# Patient Record
Sex: FEMALE | Race: WHITE | NOT HISPANIC OR LATINO | Employment: UNEMPLOYED | URBAN - METROPOLITAN AREA
[De-identification: names, ages, dates, MRNs, and addresses within clinical notes are randomized per-mention and may not be internally consistent; named-entity substitution may affect disease eponyms.]

---
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
## 2022-01-05 DIAGNOSIS — Z11.59 SCREENING FOR VIRAL DISEASE: Primary | ICD-10-CM

## 2022-01-05 PROCEDURE — U0003 INFECTIOUS AGENT DETECTION BY NUCLEIC ACID (DNA OR RNA); SEVERE ACUTE RESPIRATORY SYNDROME CORONAVIRUS 2 (SARS-COV-2) (CORONAVIRUS DISEASE [COVID-19]), AMPLIFIED PROBE TECHNIQUE, MAKING USE OF HIGH THROUGHPUT TECHNOLOGIES AS DESCRIBED BY CMS-2020-01-R: HCPCS | Performed by: INTERNAL MEDICINE

## 2022-01-05 PROCEDURE — U0005 INFEC AGEN DETEC AMPLI PROBE: HCPCS | Performed by: INTERNAL MEDICINE

## 2022-06-03 DIAGNOSIS — J40 BRONCHITIS: Primary | ICD-10-CM

## 2022-06-03 RX ORDER — ALBUTEROL SULFATE 90 UG/1
2 AEROSOL, METERED RESPIRATORY (INHALATION) EVERY 6 HOURS PRN
Qty: 18 G | Refills: 5 | Status: SHIPPED | OUTPATIENT
Start: 2022-06-03

## 2022-06-03 RX ORDER — AZITHROMYCIN 250 MG/1
TABLET, FILM COATED ORAL
Qty: 6 TABLET | Refills: 0 | Status: SHIPPED | OUTPATIENT
Start: 2022-06-03 | End: 2022-06-07

## 2022-06-08 DIAGNOSIS — J32.9 SINUSITIS, UNSPECIFIED CHRONICITY, UNSPECIFIED LOCATION: Primary | ICD-10-CM

## 2022-06-08 RX ORDER — LEVOFLOXACIN 500 MG/1
500 TABLET, FILM COATED ORAL EVERY 24 HOURS
Qty: 10 TABLET | Refills: 0 | Status: SHIPPED | OUTPATIENT
Start: 2022-06-08 | End: 2022-06-18

## 2022-07-19 ENCOUNTER — CLINICAL SUPPORT (OUTPATIENT)
Dept: FAMILY MEDICINE CLINIC | Facility: CLINIC | Age: 22
End: 2022-07-19

## 2022-07-19 DIAGNOSIS — F41.9 ANXIETY: Primary | ICD-10-CM

## 2022-07-26 DIAGNOSIS — F32.A DEPRESSION, UNSPECIFIED DEPRESSION TYPE: Primary | ICD-10-CM

## 2022-07-26 RX ORDER — NORTRIPTYLINE HYDROCHLORIDE 25 MG/1
25 CAPSULE ORAL
Qty: 30 CAPSULE | Refills: 3 | Status: SHIPPED | OUTPATIENT
Start: 2022-07-26 | End: 2022-08-17

## 2022-08-16 ENCOUNTER — TELEPHONE (OUTPATIENT)
Dept: OTHER | Facility: OTHER | Age: 22
End: 2022-08-16

## 2022-08-16 ENCOUNTER — OFFICE VISIT (OUTPATIENT)
Dept: FAMILY MEDICINE CLINIC | Facility: CLINIC | Age: 22
End: 2022-08-16
Payer: COMMERCIAL

## 2022-08-16 VITALS — DIASTOLIC BLOOD PRESSURE: 80 MMHG | HEART RATE: 100 BPM | SYSTOLIC BLOOD PRESSURE: 120 MMHG

## 2022-08-16 DIAGNOSIS — L02.31 ABSCESS, GLUTEAL, LEFT: Primary | ICD-10-CM

## 2022-08-16 PROCEDURE — 99214 OFFICE O/P EST MOD 30 MIN: CPT | Performed by: INTERNAL MEDICINE

## 2022-08-16 NOTE — TELEPHONE ENCOUNTER
Patient's father along with patient calling in to make an appt with General Surgery after referral today from PCP office  The closest location to them is the [facility] office but they are willing to go wherever she can get the first available consult  Please follow up to schedule appt

## 2022-08-16 NOTE — ASSESSMENT & PLAN NOTE
Left gluteal abscess noted 3 days ago following a possible insect bite  Manual expression of purulent material done in the office  Concern for possible purulent loculations  She denies fever, nausea vomiting or any signs of systemic toxicity  Will need general surgery evaluation for possible I& D   · Ambulatory referral to General surgery  · Continue as needed Tylenol/Advil  · Hold off on warm water soaks for now    · Call office with signs of worsening or systemic toxicity  · Will hold off on antibiotics for now - advised to call office if worsening  · If worsening, consider a MRSA-covering antimicrobial

## 2022-08-16 NOTE — PROGRESS NOTES
Assessment/Plan:     1  Abscess, gluteal, left  Assessment & Plan:  Left gluteal abscess noted 3 days ago following a possible insect bite  Manual expression of purulent material done in the office  Concern for possible purulent loculations  She denies fever, nausea vomiting or any signs of systemic toxicity  Will need general surgery evaluation for possible I& D   · Ambulatory referral to General surgery  · Continue as needed Tylenol/Advil  · Hold off on warm water soaks for now  · Call office with signs of worsening or systemic toxicity  · Will hold off on antibiotics for now - advised to call office if worsening  · If worsening, consider a MRSA-covering antimicrobial       Orders:  -     Ambulatory Referral to General Surgery; Future; Expected date: 08/17/2022  Diagnoses and all orders for this visit:    Abscess, gluteal, left  -     Ambulatory Referral to General Surgery; Future        Subjective:          Patient ID: Buffy Patterson is a 23 y o  female  Buffy Patterson is a 51-year-old female who is here for evaluation of  possible rash  Patient did state that she was out of [place] where she noticed a bump in the left gluteal area  Initially mild however progressed will bump requiring drainage at home  She applied warm compress, and warm soaks in Epsom salt with minimal relief  Denies any fever, chills, nausea vomiting or any signs of systemic toxicity  Denies any previous occurrence of similar pathology  The following portions of the patient's history were reviewed and updated as appropriate: allergies, current medications, past family history, past medical history, past social history, past surgical history and problem list     Review of Systems   Constitutional: Negative for activity change, appetite change, chills and fever  Respiratory: Negative for cough and chest tightness  Cardiovascular: Negative for chest pain and palpitations     Gastrointestinal: Negative for abdominal distention, diarrhea and vomiting  Genitourinary: Negative for difficulty urinating, dysuria, flank pain and hematuria  Musculoskeletal: Negative for arthralgias, gait problem and myalgias  Skin: Positive for rash and wound  Neurological: Negative for dizziness, seizures and numbness  Objective:      /80   Pulse 100          Physical Exam  Vitals reviewed  Constitutional:       Appearance: Normal appearance  HENT:      Head: Normocephalic and atraumatic  Mouth/Throat:      Mouth: Mucous membranes are moist    Eyes:      Pupils: Pupils are equal, round, and reactive to light  Cardiovascular:      Rate and Rhythm: Normal rate and regular rhythm  Pulses: Normal pulses  Heart sounds: Normal heart sounds  Pulmonary:      Effort: Pulmonary effort is normal  No respiratory distress  Breath sounds: No stridor  Abdominal:      General: Bowel sounds are normal  There is no distension  Palpations: Abdomen is soft  There is no mass  Tenderness: There is no abdominal tenderness  Musculoskeletal:         General: Tenderness present  No swelling  Normal range of motion  Cervical back: Normal range of motion and neck supple  Right lower leg: No edema  Left lower leg: No edema  Legs:    Skin:     General: Skin is warm  Neurological:      Mental Status: She is alert and oriented to person, place, and time  Mental status is at baseline  Psychiatric:         Mood and Affect: Mood normal          Behavior: Behavior normal          Thought Content:  Thought content normal          Judgment: Judgment normal

## 2022-08-17 ENCOUNTER — OFFICE VISIT (OUTPATIENT)
Dept: SURGERY | Facility: CLINIC | Age: 22
End: 2022-08-17
Payer: COMMERCIAL

## 2022-08-17 VITALS — BODY MASS INDEX: 25.84 KG/M2 | HEIGHT: 67 IN

## 2022-08-17 DIAGNOSIS — L02.91 ABSCESS: Primary | ICD-10-CM

## 2022-08-17 DIAGNOSIS — F32.A DEPRESSION, UNSPECIFIED DEPRESSION TYPE: ICD-10-CM

## 2022-08-17 DIAGNOSIS — L02.31 ABSCESS, GLUTEAL, LEFT: Primary | ICD-10-CM

## 2022-08-17 PROCEDURE — 10060 I&D ABSCESS SIMPLE/SINGLE: CPT | Performed by: SURGERY

## 2022-08-17 RX ORDER — CLINDAMYCIN HYDROCHLORIDE 300 MG/1
300 CAPSULE ORAL 3 TIMES DAILY
Qty: 30 CAPSULE | Refills: 0 | Status: SHIPPED | OUTPATIENT
Start: 2022-08-17 | End: 2022-08-27

## 2022-08-17 RX ORDER — NORTRIPTYLINE HYDROCHLORIDE 25 MG/1
CAPSULE ORAL
Qty: 90 CAPSULE | Refills: 2 | Status: SHIPPED | OUTPATIENT
Start: 2022-08-17 | End: 2022-09-29

## 2022-08-17 NOTE — PROGRESS NOTES
Office Visit - General Surgery  Rodrick Iglesias MRN: [id number]  Encounter: [id number]    Assessment and Plan  Problem List Items Addressed This Visit        Other    Abscess, gluteal, left - Primary     There was not a lot of purulence remaining  I did pack the wound  I asked him remove the packing tomorrow and shower daily and keep the wound covered as long as it is draining  I told him a I thought it was reasonable to use antibiotics since there was a fairly large area of erythema  I will see her back here if any changes  Chief Complaint:  Rodrick Iglesias is a 23 y o  female who presents for Follow-up (Bug bite)    Subjective  23year old female who may have gotten bitten on her left buttock last week  She noted it was wound wider and had some drainage over the weekend  She saw her family doctor yesterday was crease a more material out and sent here for further evaluation  Family doc also sent a prescription for antibiotics  She has some pain at the site and notices minimal to moderate drainage  Past Medical History:   Diagnosis Date    Anxiety     Seasonal allergies        History reviewed  No pertinent surgical history      Family History   Problem Relation Age of Onset    Heart attack Mother     Heart disease Mother     Heart disease Father     Diabetes Father     No Known Problems Brother     Breast cancer Maternal Grandmother     Hypertension Maternal Grandmother     Heart disease Maternal Grandmother     Heart disease Maternal Grandfather     Diabetes Maternal Grandfather     Asthma Maternal Grandfather     No Known Problems Paternal Grandmother     Diabetes Paternal Grandfather        Social History     Tobacco Use    Smoking status: Never Smoker    Smokeless tobacco: Never Used   Vaping Use    Vaping Use: Never used   Substance Use Topics    Alcohol use: Not Currently    Drug use: Never        Medications  Current Outpatient Medications on File Prior to Visit Medication Sig Dispense Refill    albuterol (Ventolin HFA) 90 mcg/act inhaler Inhale 2 puffs every 6 (six) hours as needed for wheezing 18 g 5    hydrOXYzine HCL (ATARAX) 25 mg tablet TAKE 1 TABLET (25 MG TOTAL) BY MOUTH 2 TIMES A DAY AS NEEDED FOR ANXIETY 60 tablet 3    nortriptyline (PAMELOR) 25 mg capsule Take 1 capsule (25 mg total) by mouth daily at bedtime 30 capsule 3     No current facility-administered medications on file prior to visit  Allergies  No Known Allergies    Review of Systems    Objective  There were no vitals filed for this visit  Physical Exam  Left buttock almost at the skin crease with her thigh is an open area about 3 x 3 mm with drainage present  There is surrounding erythema for up to 8 cm around this area  No crepitance, no real fluctuance    Procedures  After permission, the areas prepped and draped  Time-out taken  Local anesthesia of 2% lidocaine with epi was infiltrated into the skin and surrounding areas  A scissors was used and cut into the open area and probed  A hemostat was used to de loculated the area  There was not a lot of gross purulence removed  A piece of quarter-inch packing was then placed in dressing applied  Tolerated procedure well

## 2022-08-17 NOTE — ASSESSMENT & PLAN NOTE
There was not a lot of purulence remaining  I did pack the wound  I asked him remove the packing tomorrow and shower daily and keep the wound covered as long as it is draining  I told him a I thought it was reasonable to use antibiotics since there was a fairly large area of erythema  I will see her back here if any changes

## 2022-09-29 DIAGNOSIS — F32.A DEPRESSION, UNSPECIFIED DEPRESSION TYPE: ICD-10-CM

## 2022-09-29 RX ORDER — NORTRIPTYLINE HYDROCHLORIDE 25 MG/1
50 CAPSULE ORAL
Qty: 90 CAPSULE | Refills: 2 | Status: SHIPPED | OUTPATIENT
Start: 2022-09-29 | End: 2022-10-26

## 2022-10-26 DIAGNOSIS — F32.A DEPRESSION, UNSPECIFIED DEPRESSION TYPE: ICD-10-CM

## 2022-10-26 RX ORDER — NORTRIPTYLINE HYDROCHLORIDE 25 MG/1
CAPSULE ORAL
Qty: 180 CAPSULE | Refills: 2 | Status: SHIPPED | OUTPATIENT
Start: 2022-10-26

## 2022-12-04 DIAGNOSIS — F41.9 ANXIETY: ICD-10-CM

## 2022-12-05 RX ORDER — HYDROXYZINE HYDROCHLORIDE 25 MG/1
TABLET, FILM COATED ORAL
Qty: 60 TABLET | Refills: 3 | Status: SHIPPED | OUTPATIENT
Start: 2022-12-05

## 2022-12-31 DIAGNOSIS — F41.9 ANXIETY: ICD-10-CM

## 2023-01-03 RX ORDER — HYDROXYZINE HYDROCHLORIDE 25 MG/1
TABLET, FILM COATED ORAL
Qty: 180 TABLET | Refills: 2 | Status: SHIPPED | OUTPATIENT
Start: 2023-01-03

## 2023-01-12 ENCOUNTER — TELEPHONE (OUTPATIENT)
Dept: PSYCHIATRY | Facility: CLINIC | Age: 23
End: 2023-01-12

## 2023-01-12 ENCOUNTER — OFFICE VISIT (OUTPATIENT)
Dept: OBGYN CLINIC | Facility: MEDICAL CENTER | Age: 23
End: 2023-01-12

## 2023-01-12 VITALS
BODY MASS INDEX: 27.94 KG/M2 | DIASTOLIC BLOOD PRESSURE: 82 MMHG | WEIGHT: 178 LBS | HEIGHT: 67 IN | SYSTOLIC BLOOD PRESSURE: 124 MMHG

## 2023-01-12 DIAGNOSIS — F32.0 CURRENT MILD EPISODE OF MAJOR DEPRESSIVE DISORDER, UNSPECIFIED WHETHER RECURRENT (HCC): ICD-10-CM

## 2023-01-12 DIAGNOSIS — Z11.3 SCREEN FOR STD (SEXUALLY TRANSMITTED DISEASE): ICD-10-CM

## 2023-01-12 DIAGNOSIS — Z01.419 ENCOUNTER FOR ANNUAL ROUTINE GYNECOLOGICAL EXAMINATION: Primary | ICD-10-CM

## 2023-01-12 NOTE — PROGRESS NOTES
Assessment/Plan:    22 yo G0 - annual exam      Problem List Items Addressed This Visit    None  Visit Diagnoses     Encounter for annual routine gynecological examination    -  Primary  First pap today    Current mild episode of major depressive disorder, unspecified whether recurrent [facility])        Relevant Orders    Ambulatory Referral to Behavioral Health Therapists            Subjective:      Patient ID: Suni Krueger is a 23 y o  female  This is a 23 y o   with LMP: 22  Patient is premenopausal     Concerns: none    Contraception: condoms- reports using every time  Does not desire further contraception  Periods: are regular, normal amount and duration  Sexually active: yes,  w/o issue  STD testing: no  No bladder concerns  NO change in bowel movements     Screening:  Gardisil vaccine: UTD    Family history:   Breast cancer: grandmother  Ovarian cancer: none  Colon cancer: none    Body mass index is 28 3 kg/m²  Exercise: 4x/week  Diet:healthy  Smoking: non smoker    Depression/anxiety - meds working well  Myron at [employer] 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  The following portions of the patient's history were reviewed and updated as appropriate: allergies, current medications, past family history, past social history, past surgical history and problem list     Review of Systems   Constitutional: Negative  HENT: Negative  Eyes: Negative  Respiratory: Negative  Cardiovascular: Negative  Gastrointestinal: Negative  Endocrine: Negative  Genitourinary: Negative for dyspareunia, dysuria, frequency, menstrual problem, pelvic pain, vaginal discharge and vaginal pain  Musculoskeletal: Negative  Skin: Negative  Allergic/Immunologic: Negative  Neurological: Negative  Hematological: Negative  Psychiatric/Behavioral: Negative            Objective:      /82 (BP Location: Left arm, Patient Position: Sitting, Cuff Size: Adult)   Ht 5' 6 5" (1 689 m)   Wt 80 7 kg (178 lb)   LMP 12/29/2022 (Exact Date)   BMI 28 30 kg/m²          Physical Exam  Vitals reviewed  Cardiovascular:      Rate and Rhythm: Normal rate  Pulmonary:      Effort: Pulmonary effort is normal    Chest:   Breasts:     Breasts are symmetrical       Right: No mass, nipple discharge, skin change or tenderness  Left: No mass, nipple discharge, skin change or tenderness  Abdominal:      Palpations: Abdomen is soft  Genitourinary:     Labia:         Right: No rash  Left: No rash  Vagina: Normal  No signs of injury  Cervix: No cervical motion tenderness, discharge, friability or lesion  Uterus: Not deviated, not enlarged, not fixed and not tender  Adnexa:         Right: No mass, tenderness or fullness  Left: No mass, tenderness or fullness  Musculoskeletal:      Cervical back: Normal range of motion  Skin:     General: Skin is warm and dry  Neurological:      Mental Status: She is alert and oriented to person, place, and time

## 2023-01-13 LAB
C TRACH DNA SPEC QL NAA+PROBE: NEGATIVE
N GONORRHOEA DNA SPEC QL NAA+PROBE: NEGATIVE

## 2023-01-17 LAB — T VAGINALIS RRNA SPEC QL NAA+PROBE: NEGATIVE

## 2023-01-20 DIAGNOSIS — Z30.011 ORAL CONTRACEPTION INITIAL PRESCRIPTION: Primary | ICD-10-CM

## 2023-01-20 LAB
LAB AP GYN PRIMARY INTERPRETATION: NORMAL
Lab: NORMAL

## 2023-01-20 RX ORDER — DROSPIRENONE AND ETHINYL ESTRADIOL 0.03MG-3MG
1 KIT ORAL DAILY
Qty: 90 TABLET | Refills: 3 | Status: SHIPPED | OUTPATIENT
Start: 2023-01-20

## 2023-03-10 DIAGNOSIS — H92.09 OTALGIA, UNSPECIFIED LATERALITY: Primary | ICD-10-CM

## 2023-03-10 RX ORDER — AMOXICILLIN 500 MG/1
500 CAPSULE ORAL EVERY 12 HOURS SCHEDULED
Qty: 14 CAPSULE | Refills: 0 | Status: SHIPPED | OUTPATIENT
Start: 2023-03-10 | End: 2023-03-17

## 2023-05-15 DIAGNOSIS — J32.9 SINUSITIS, UNSPECIFIED CHRONICITY, UNSPECIFIED LOCATION: Primary | ICD-10-CM

## 2023-05-15 RX ORDER — AZITHROMYCIN 250 MG/1
TABLET, FILM COATED ORAL
Qty: 6 TABLET | Refills: 0 | Status: SHIPPED | OUTPATIENT
Start: 2023-05-15 | End: 2023-05-19

## 2023-09-11 DIAGNOSIS — F32.A DEPRESSION, UNSPECIFIED DEPRESSION TYPE: ICD-10-CM

## 2023-09-13 RX ORDER — NORTRIPTYLINE HYDROCHLORIDE 25 MG/1
CAPSULE ORAL
Qty: 60 CAPSULE | Refills: 8 | Status: SHIPPED | OUTPATIENT
Start: 2023-09-13

## 2023-10-04 ENCOUNTER — TELEMEDICINE (OUTPATIENT)
Dept: FAMILY MEDICINE CLINIC | Facility: CLINIC | Age: 23
End: 2023-10-04
Payer: COMMERCIAL

## 2023-10-04 VITALS — WEIGHT: 178 LBS | BODY MASS INDEX: 27.94 KG/M2 | HEIGHT: 67 IN

## 2023-10-04 DIAGNOSIS — F32.A DEPRESSION, UNSPECIFIED DEPRESSION TYPE: ICD-10-CM

## 2023-10-04 DIAGNOSIS — F41.9 ANXIETY: Primary | ICD-10-CM

## 2023-10-04 PROCEDURE — 99214 OFFICE O/P EST MOD 30 MIN: CPT | Performed by: INTERNAL MEDICINE

## 2023-10-04 RX ORDER — NORTRIPTYLINE HYDROCHLORIDE 50 MG/1
50 CAPSULE ORAL DAILY
Qty: 30 CAPSULE | Refills: 3 | Status: SHIPPED | OUTPATIENT
Start: 2023-10-04 | End: 2023-10-05

## 2023-10-05 DIAGNOSIS — Z76.0 MEDICATION REFILL: Primary | ICD-10-CM

## 2023-10-05 RX ORDER — NORTRIPTYLINE HYDROCHLORIDE 75 MG/1
75 CAPSULE ORAL
Qty: 30 CAPSULE | Refills: 3 | Status: SHIPPED | OUTPATIENT
Start: 2023-10-05 | End: 2023-10-30

## 2023-10-30 DIAGNOSIS — Z76.0 MEDICATION REFILL: ICD-10-CM

## 2023-10-30 RX ORDER — NORTRIPTYLINE HYDROCHLORIDE 75 MG/1
75 CAPSULE ORAL
Qty: 90 CAPSULE | Refills: 1 | Status: SHIPPED | OUTPATIENT
Start: 2023-10-30

## 2023-11-10 ENCOUNTER — TELEPHONE (OUTPATIENT)
Dept: OBGYN CLINIC | Facility: CLINIC | Age: 23
End: 2023-11-10

## 2023-11-10 DIAGNOSIS — Z30.011 ORAL CONTRACEPTION INITIAL PRESCRIPTION: ICD-10-CM

## 2023-11-10 RX ORDER — DROSPIRENONE AND ETHINYL ESTRADIOL 0.03MG-3MG
1 KIT ORAL DAILY
Qty: 90 TABLET | Refills: 0 | Status: SHIPPED | OUTPATIENT
Start: 2023-11-10

## 2023-11-10 NOTE — TELEPHONE ENCOUNTER
Patient is scheduled for a yearly in January, however will run out of her bc before visit and is wondering if we could refill to hold over.

## 2023-11-16 NOTE — TELEPHONE ENCOUNTER
Pt's Mom Zion Salmeron is calling because she spoke with [facility] and they state that it is too soon for her daughter [employer] to  her B/C pill refills. Could this be because she was previously prescribed 3 months at a time? Ms. Darnell Dowd has no more pills. Please advise.

## 2023-11-16 NOTE — TELEPHONE ENCOUNTER
Mom Awilda Monahan called back to inform us that Joey Carmona had picked up a B/C pill refill from [facility] on 10/10/23 but it was just for a 1 month supply. Mom seems to think it was dispensed incorrectly and should've been for the 3 months.

## 2023-12-20 ENCOUNTER — OFFICE VISIT (OUTPATIENT)
Dept: FAMILY MEDICINE CLINIC | Facility: CLINIC | Age: 23
End: 2023-12-20
Payer: COMMERCIAL

## 2023-12-20 VITALS
OXYGEN SATURATION: 99 % | HEIGHT: 67 IN | RESPIRATION RATE: 16 BRPM | SYSTOLIC BLOOD PRESSURE: 110 MMHG | TEMPERATURE: 98.9 F | WEIGHT: 217 LBS | DIASTOLIC BLOOD PRESSURE: 60 MMHG | BODY MASS INDEX: 34.06 KG/M2 | HEART RATE: 113 BPM

## 2023-12-20 DIAGNOSIS — F32.A DEPRESSION, UNSPECIFIED DEPRESSION TYPE: Primary | ICD-10-CM

## 2023-12-20 PROBLEM — R45.86 MOOD SWINGS: Status: ACTIVE | Noted: 2023-12-20

## 2023-12-20 PROCEDURE — 99214 OFFICE O/P EST MOD 30 MIN: CPT | Performed by: INTERNAL MEDICINE

## 2023-12-20 RX ORDER — DESVENLAFAXINE SUCCINATE 50 MG/1
50 TABLET, EXTENDED RELEASE ORAL DAILY
Qty: 30 TABLET | Refills: 5 | Status: SHIPPED | OUTPATIENT
Start: 2023-12-20 | End: 2024-06-17

## 2023-12-20 NOTE — PROGRESS NOTES
Assessment/Plan:         Problem List Items Addressed This Visit    None        Subjective:      Patient ID: Safia Saldana is a 23 y.o. female.    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  Her score on the PHQ9 here today is 16, consistent with moderately severe depression, and she has been taking nortriptyline for at least a year now.  She's wondering if it just isn't working anymore or possibly never really worked, etc-sees a therapist, Shima, who gives her coping strategies but she still struggles.  She also brought with her today a copy of her Gene Sight testing that guides prescribing of antidepressants as per her genome       The following portions of the patient's history were reviewed and updated as appropriate:   Past Medical History:  She has a past medical history of Anxiety, Depression, and Seasonal allergies.,  _______________________________________________________________________  Medical Problems:  does not have any pertinent problems on file.,  _______________________________________________________________________  Past Surgical History:   has no past surgical history on file.,  _______________________________________________________________________  Family History:  family history includes Asthma in her maternal grandfather; Breast cancer in her maternal grandmother; Diabetes in her father, maternal grandfather, and paternal grandfather; Heart attack in her mother; Heart disease in her father, maternal grandfather, maternal grandmother, and mother; Hypertension in her maternal grandmother; No Known Problems in her brother and paternal grandmother.,  _______________________________________________________________________  Social History:   reports that she has never smoked. She has never used smokeless tobacco. She reports  "current alcohol use. She reports that she does not use drugs.,  _______________________________________________________________________  Allergies:  has No Known Allergies..  _______________________________________________________________________  Current Outpatient Medications   Medication Sig Dispense Refill    albuterol (Ventolin HFA) 90 mcg/act inhaler Inhale 2 puffs every 6 (six) hours as needed for wheezing 18 g 5    drospirenone-ethinyl estradiol (VINICIO) 3-0.03 MG per tablet Take 1 tablet by mouth daily 90 tablet 0    hydrOXYzine HCL (ATARAX) 25 mg tablet TAKE 1 TABLET BY MOUTH TWICE A DAY AS NEEDED FOR ANXIETY 180 tablet 2    nortriptyline (PAMELOR) 75 MG capsule TAKE 1 CAPSULE (75 MG TOTAL) BY MOUTH DAILY AT BEDTIME 90 capsule 1     No current facility-administered medications for this visit.     _______________________________________________________________________  Review of Systems   Constitutional: Negative.    Respiratory: Negative.     Cardiovascular: Negative.    Neurological: Negative.    Psychiatric/Behavioral:  Positive for behavioral problems, dysphoric mood and sleep disturbance. The patient is nervous/anxious.          Objective:  Vitals:    12/20/23 1406   BP: 110/60   BP Location: Left arm   Patient Position: Sitting   Cuff Size: Standard   Pulse: (!) 113   Resp: 16   Temp: 98.9 °F (37.2 °C)   TempSrc: Tympanic   SpO2: 99%   Weight: 98.4 kg (217 lb)   Height: 5' 6.5\" (1.689 m)     Body mass index is 34.5 kg/m².     Physical Exam  HENT:      Head: Normocephalic and atraumatic.      Right Ear: External ear normal.      Left Ear: External ear normal.      Nose: Nose normal.   Pulmonary:      Effort: Pulmonary effort is normal.   Musculoskeletal:         General: Normal range of motion.   Skin:     Capillary Refill: Capillary refill takes less than 2 seconds.   Neurological:      General: No focal deficit present.      Mental Status: She is alert and oriented to person, place, and time.       "

## 2024-01-08 ENCOUNTER — OFFICE VISIT (OUTPATIENT)
Dept: FAMILY MEDICINE CLINIC | Facility: CLINIC | Age: 24
End: 2024-01-08
Payer: COMMERCIAL

## 2024-01-08 VITALS
RESPIRATION RATE: 16 BRPM | BODY MASS INDEX: 34.06 KG/M2 | HEIGHT: 67 IN | DIASTOLIC BLOOD PRESSURE: 82 MMHG | WEIGHT: 217 LBS | SYSTOLIC BLOOD PRESSURE: 114 MMHG | HEART RATE: 92 BPM | OXYGEN SATURATION: 99 % | TEMPERATURE: 98.8 F

## 2024-01-08 DIAGNOSIS — F32.A DEPRESSION, UNSPECIFIED DEPRESSION TYPE: Primary | ICD-10-CM

## 2024-01-08 DIAGNOSIS — F41.9 ANXIETY: ICD-10-CM

## 2024-01-08 PROCEDURE — 99214 OFFICE O/P EST MOD 30 MIN: CPT | Performed by: INTERNAL MEDICINE

## 2024-01-08 RX ORDER — HYDROXYZINE HYDROCHLORIDE 25 MG/1
25 TABLET, FILM COATED ORAL 2 TIMES DAILY PRN
Qty: 180 TABLET | Refills: 2 | Status: SHIPPED | OUTPATIENT
Start: 2024-01-08

## 2024-01-08 NOTE — PROGRESS NOTES
Assessment/Plan:Safia doing better on Pristiq in terms of her depression-feels more motivated, etc-anxiety a bit more however-she has not been taking her hydroxyzine-she does see a therapist once a week and she does give her some CBT techniques to apply-she did also complete the MDQ survey and it screens positive but Safia does NOT feel as though she is bipolar and her therapist does not either.  She is going back to college soon and is planning on exercising and eating healthy to see if that helps-we refilled her atarax today and she is going to take it a bit more consistently         Problem List Items Addressed This Visit       Anxiety    Relevant Medications    hydrOXYzine HCL (ATARAX) 25 mg tablet    Depression - Primary    Relevant Medications    hydrOXYzine HCL (ATARAX) 25 mg tablet         Subjective:      Patient ID: Safia Saldana is a 23 y.o. female.    Safia here to follow up on her Pristiq start- started a few weeks ago- feels like her depression is better but anxiety heightened some-she has not been taking her hydroxyzine at all-says there was a week where she didn't sleep on the med but that has improved-she is able to get up and out of bed without issue    Anxiety  Symptoms include nervous/anxious behavior.       Depression        The following portions of the patient's history were reviewed and updated as appropriate:   Past Medical History:  She has a past medical history of Anxiety, Depression, and Seasonal allergies.,  _______________________________________________________________________  Medical Problems:  does not have any pertinent problems on file.,  _______________________________________________________________________  Past Surgical History:   has no past surgical history on file.,  _______________________________________________________________________  Family History:  family history includes Asthma in her maternal grandfather; Breast cancer in her maternal grandmother; Diabetes  "in her father, maternal grandfather, and paternal grandfather; Heart attack in her mother; Heart disease in her father, maternal grandfather, maternal grandmother, and mother; Hypertension in her maternal grandmother; No Known Problems in her brother and paternal grandmother.,  _______________________________________________________________________  Social History:   reports that she has never smoked. She has never used smokeless tobacco. She reports current alcohol use. She reports that she does not use drugs.,  _______________________________________________________________________  Allergies:  has No Known Allergies..  _______________________________________________________________________  Current Outpatient Medications   Medication Sig Dispense Refill    albuterol (Ventolin HFA) 90 mcg/act inhaler Inhale 2 puffs every 6 (six) hours as needed for wheezing 18 g 5    desvenlafaxine succinate (PRISTIQ) 50 mg 24 hr tablet Take 1 tablet (50 mg total) by mouth daily 30 tablet 5    drospirenone-ethinyl estradiol (VINICIO) 3-0.03 MG per tablet Take 1 tablet by mouth daily 90 tablet 0    hydrOXYzine HCL (ATARAX) 25 mg tablet Take 1 tablet (25 mg total) by mouth 2 (two) times a day as needed for anxiety 180 tablet 2     No current facility-administered medications for this visit.     _______________________________________________________________________  Review of Systems   Psychiatric/Behavioral:  Positive for depression and dysphoric mood. The patient is nervous/anxious.          Objective:  Vitals:    01/08/24 1340   BP: 114/82   BP Location: Left arm   Patient Position: Sitting   Cuff Size: Large   Pulse: 92   Resp: 16   Temp: 98.8 °F (37.1 °C)   TempSrc: Tympanic   SpO2: 99%   Weight: 98.4 kg (217 lb)   Height: 5' 6.5\" (1.689 m)     Body mass index is 34.5 kg/m².     Physical Exam  Constitutional:       Appearance: Normal appearance.   HENT:      Head: Normocephalic and atraumatic.   Pulmonary:      Effort: Pulmonary " effort is normal.   Neurological:      General: No focal deficit present.      Mental Status: She is alert and oriented to person, place, and time.   Psychiatric:         Mood and Affect: Mood normal.         Thought Content: Thought content normal.         Judgment: Judgment normal.

## 2024-01-12 DIAGNOSIS — F32.A DEPRESSION, UNSPECIFIED DEPRESSION TYPE: ICD-10-CM

## 2024-01-12 RX ORDER — DESVENLAFAXINE SUCCINATE 50 MG/1
50 TABLET, EXTENDED RELEASE ORAL DAILY
Qty: 90 TABLET | Refills: 1 | Status: SHIPPED | OUTPATIENT
Start: 2024-01-12

## 2024-01-17 ENCOUNTER — TELEPHONE (OUTPATIENT)
Dept: OBGYN CLINIC | Facility: CLINIC | Age: 24
End: 2024-01-17

## 2024-01-17 DIAGNOSIS — Z30.011 ORAL CONTRACEPTION INITIAL PRESCRIPTION: ICD-10-CM

## 2024-01-17 RX ORDER — DROSPIRENONE AND ETHINYL ESTRADIOL 0.03MG-3MG
1 KIT ORAL DAILY
Qty: 90 TABLET | Refills: 0 | Status: SHIPPED | OUTPATIENT
Start: 2024-01-17

## 2024-01-17 NOTE — TELEPHONE ENCOUNTER
Patient is sick today and had to cancel her yearly. She has viral symptoms and did not want to get anyone sick. She is out of refills on her birth control and would like a refill until we are able to see her. Rescheduled for the end of February

## 2024-02-23 ENCOUNTER — ANNUAL EXAM (OUTPATIENT)
Dept: OBGYN CLINIC | Facility: MEDICAL CENTER | Age: 24
End: 2024-02-23
Payer: COMMERCIAL

## 2024-02-23 VITALS
BODY MASS INDEX: 37.2 KG/M2 | WEIGHT: 237 LBS | DIASTOLIC BLOOD PRESSURE: 80 MMHG | HEIGHT: 67 IN | SYSTOLIC BLOOD PRESSURE: 130 MMHG

## 2024-02-23 DIAGNOSIS — Z30.41 SURVEILLANCE OF CONTRACEPTIVE PILL: ICD-10-CM

## 2024-02-23 DIAGNOSIS — Z30.011 ORAL CONTRACEPTION INITIAL PRESCRIPTION: ICD-10-CM

## 2024-02-23 DIAGNOSIS — Z11.3 SCREEN FOR STD (SEXUALLY TRANSMITTED DISEASE): ICD-10-CM

## 2024-02-23 DIAGNOSIS — Z01.419 ENCOUNTER FOR GYNECOLOGICAL EXAMINATION (GENERAL) (ROUTINE) WITHOUT ABNORMAL FINDINGS: Primary | ICD-10-CM

## 2024-02-23 PROCEDURE — 99395 PREV VISIT EST AGE 18-39: CPT | Performed by: CLINICAL NURSE SPECIALIST

## 2024-02-23 PROCEDURE — 87591 N.GONORRHOEAE DNA AMP PROB: CPT | Performed by: CLINICAL NURSE SPECIALIST

## 2024-02-23 PROCEDURE — 87491 CHLMYD TRACH DNA AMP PROBE: CPT | Performed by: CLINICAL NURSE SPECIALIST

## 2024-02-23 RX ORDER — DROSPIRENONE AND ETHINYL ESTRADIOL 0.03MG-3MG
1 KIT ORAL DAILY
Qty: 90 TABLET | Refills: 3 | Status: SHIPPED | OUTPATIENT
Start: 2024-02-23

## 2024-02-23 NOTE — PROGRESS NOTES
Subjective:        Safia Saldana is a 24 y.o. female. Here for Gynecologic Exam (Pap 1/12/23 neg/Bc- bharati /Maternal grandmother breast cancer )      GYN HPI  Menstrual cycle:  Patient denies menstrual complaints. Regular monthly menses, not excessive  Vaginal c/o: denies  Urinary c/o: denies  Breast complaints:denies  She does not do self breast Exams    Sexually active: yes, but not presently  Contraception: OCP- bharati. Denies ACHES denies other adverse  She reports she feels safe at home.     Dietary calcium/vit D  intake: adequate  Lifestyle: sedentary-     HEALTH MAINTENANCE SCREENINGS:    Last Papanicolaou test:  01/12/2023   History of abnormal pap: No    IMMUNIZATIONS  Gardasil HPV vaccine was completed on 2014    Hereditary Cancer Screening  Cancer-related family history includes Breast cancer in her maternal grandmother; Cancer in her mother. There is no history of Colon cancer or Ovarian cancer.    Substance Abuse Screening Completed. See hx and flowsheet.     The following portions of the patient's history were reviewed and updated as appropriate: allergies, current medications, past family history, past medical history, past social history, past surgical history, and problem list.       Review of Systems   Constitutional:  Negative for appetite change, chills, fatigue, fever and unexpected weight change.   HENT: Negative.     Eyes: Negative.    Respiratory:  Negative for chest tightness and shortness of breath.    Cardiovascular:  Negative for chest pain and palpitations.   Gastrointestinal:  Negative for abdominal pain, constipation and vomiting.   Endocrine: Negative for cold intolerance and heat intolerance.   Genitourinary:         As per HPI   Musculoskeletal:  Negative for back pain, joint swelling and neck pain.   Skin:  Negative for color change and rash.   Neurological:  Negative for dizziness, weakness and numbness.   Hematological:  Does not bruise/bleed easily.  "  Psychiatric/Behavioral: Negative.               Objective:  /80 (BP Location: Left arm, Patient Position: Sitting, Cuff Size: Large)   Ht 5' 6.5\" (1.689 m)   Wt 108 kg (237 lb)   LMP 02/12/2024 (Approximate)   BMI 37.68 kg/m²        Physical Exam  Constitutional:       General: She is not in acute distress.     Appearance: Normal appearance.   Genitourinary:      Vulva and rectum normal.      No lesions in the vagina.      Right Labia: No rash or lesions.     Left Labia: No lesions or rash.     No vaginal discharge, erythema, tenderness or bleeding.        Right Adnexa: not tender and no mass present.     Left Adnexa: not tender and no mass present.     No cervical motion tenderness, discharge or friability.      Uterus is not enlarged or tender.      No urethral prolapse present.      Pelvic exam was performed with patient in the lithotomy position.   Breasts:     Breasts are symmetrical.      Right: No inverted nipple, mass, nipple discharge, skin change or tenderness.      Left: No inverted nipple, mass, nipple discharge, skin change or tenderness.   HENT:      Head: Normocephalic and atraumatic.   Cardiovascular:      Rate and Rhythm: Normal rate.      Heart sounds: No murmur heard.  Pulmonary:      Effort: Pulmonary effort is normal.      Breath sounds: Normal breath sounds.   Abdominal:      General: There is no distension.      Palpations: Abdomen is soft.      Tenderness: There is no abdominal tenderness.   Musculoskeletal:         General: Normal range of motion.      Cervical back: Normal range of motion.   Lymphadenopathy:      Cervical: No cervical adenopathy.   Neurological:      Mental Status: She is alert and oriented to person, place, and time.   Skin:     General: Skin is warm and dry.   Psychiatric:         Mood and Affect: Mood normal.         Behavior: Behavior normal.   Vitals reviewed.             Assessment/Plan:           ANNUAL GYN EXAM- Primary  Annual GYN examination completed " today.   Health maintenance reviewed/updated as appropriate  Cervical cancer screen: Previous pap smears and ASCCP screening guidelines have been reviewed. Pap not collected.  Breast Health: Encouraged regular self breast exams. Mammo n/a.  Colon cancer screening: n/a    Risk prevention and anticipatory guidance provided including:  Age related Calcium and vitamin D intake  Dietary and lifestyle recommendations based on her age and weight. body mass index is 37.68 kg/m²..    Tobacco and alcohol use, intervention ordered if applicable.   Condom use for prevention of STI's. Would like GC/CT STI Screening.  Contraception:  Currently on vinicio. Denies ACHES .. No signficant adverse effects. Desires to continue.     Problem List Items Addressed This Visit    None  Visit Diagnoses       Encounter for gynecological examination (general) (routine) without abnormal findings    -  Primary    Surveillance of contraceptive pill        Oral contraception initial prescription        Relevant Medications    drospirenone-ethinyl estradiol (VINICIO) 3-0.03 MG per tablet    Screen for STD (sexually transmitted disease)        Relevant Orders    Chlamydia/GC amplified DNA by PCR            Orders Placed This Encounter   Procedures    Chlamydia/GC amplified DNA by PCR

## 2024-02-25 LAB
C TRACH DNA SPEC QL NAA+PROBE: NEGATIVE
N GONORRHOEA DNA SPEC QL NAA+PROBE: NEGATIVE

## 2024-02-26 NOTE — RESULT ENCOUNTER NOTE
Normal Gonorrhea and Chlamydia results  Message sent via Q.ME Will reach out to outpatient provider with regards to rash, as it seems to be side effects. Treat symptomatically, check labs and ultrasound BLE.

## 2024-07-03 DIAGNOSIS — F32.A DEPRESSION, UNSPECIFIED DEPRESSION TYPE: ICD-10-CM

## 2024-07-03 RX ORDER — DESVENLAFAXINE SUCCINATE 50 MG/1
50 TABLET, EXTENDED RELEASE ORAL DAILY
Qty: 90 TABLET | Refills: 1 | Status: SHIPPED | OUTPATIENT
Start: 2024-07-03

## 2024-08-07 DIAGNOSIS — Z00.00 ANNUAL PHYSICAL EXAM: Primary | ICD-10-CM

## 2024-08-12 ENCOUNTER — APPOINTMENT (OUTPATIENT)
Dept: LAB | Facility: MEDICAL CENTER | Age: 24
End: 2024-08-12
Payer: COMMERCIAL

## 2024-08-12 DIAGNOSIS — R74.8 ELEVATED LIVER ENZYMES: Primary | ICD-10-CM

## 2024-08-12 DIAGNOSIS — Z00.00 ANNUAL PHYSICAL EXAM: ICD-10-CM

## 2024-08-12 LAB
ALBUMIN SERPL BCG-MCNC: 3.8 G/DL (ref 3.5–5)
ALP SERPL-CCNC: 51 U/L (ref 34–104)
ALT SERPL W P-5'-P-CCNC: 114 U/L (ref 7–52)
ANION GAP SERPL CALCULATED.3IONS-SCNC: 11 MMOL/L (ref 4–13)
AST SERPL W P-5'-P-CCNC: 62 U/L (ref 13–39)
BASOPHILS # BLD AUTO: 0.03 THOUSANDS/ÂΜL (ref 0–0.1)
BASOPHILS NFR BLD AUTO: 1 % (ref 0–1)
BILIRUB SERPL-MCNC: 0.44 MG/DL (ref 0.2–1)
BUN SERPL-MCNC: 14 MG/DL (ref 5–25)
CALCIUM SERPL-MCNC: 9.3 MG/DL (ref 8.4–10.2)
CHLORIDE SERPL-SCNC: 103 MMOL/L (ref 96–108)
CHOLEST SERPL-MCNC: 214 MG/DL
CO2 SERPL-SCNC: 25 MMOL/L (ref 21–32)
CREAT SERPL-MCNC: 0.75 MG/DL (ref 0.6–1.3)
EOSINOPHIL # BLD AUTO: 0.1 THOUSAND/ÂΜL (ref 0–0.61)
EOSINOPHIL NFR BLD AUTO: 2 % (ref 0–6)
ERYTHROCYTE [DISTWIDTH] IN BLOOD BY AUTOMATED COUNT: 12.4 % (ref 11.6–15.1)
GFR SERPL CREATININE-BSD FRML MDRD: 113 ML/MIN/1.73SQ M
GLUCOSE P FAST SERPL-MCNC: 112 MG/DL (ref 65–99)
HCT VFR BLD AUTO: 42 % (ref 34.8–46.1)
HDLC SERPL-MCNC: 37 MG/DL
HGB BLD-MCNC: 13.5 G/DL (ref 11.5–15.4)
IMM GRANULOCYTES # BLD AUTO: 0.01 THOUSAND/UL (ref 0–0.2)
IMM GRANULOCYTES NFR BLD AUTO: 0 % (ref 0–2)
LYMPHOCYTES # BLD AUTO: 2.25 THOUSANDS/ÂΜL (ref 0.6–4.47)
LYMPHOCYTES NFR BLD AUTO: 36 % (ref 14–44)
MCH RBC QN AUTO: 28.8 PG (ref 26.8–34.3)
MCHC RBC AUTO-ENTMCNC: 32.1 G/DL (ref 31.4–37.4)
MCV RBC AUTO: 90 FL (ref 82–98)
MONOCYTES # BLD AUTO: 0.52 THOUSAND/ÂΜL (ref 0.17–1.22)
MONOCYTES NFR BLD AUTO: 8 % (ref 4–12)
NEUTROPHILS # BLD AUTO: 3.3 THOUSANDS/ÂΜL (ref 1.85–7.62)
NEUTS SEG NFR BLD AUTO: 53 % (ref 43–75)
NONHDLC SERPL-MCNC: 177 MG/DL
NRBC BLD AUTO-RTO: 0 /100 WBCS
PLATELET # BLD AUTO: 376 THOUSANDS/UL (ref 149–390)
PMV BLD AUTO: 9.8 FL (ref 8.9–12.7)
POTASSIUM SERPL-SCNC: 4.1 MMOL/L (ref 3.5–5.3)
PROT SERPL-MCNC: 7.4 G/DL (ref 6.4–8.4)
RBC # BLD AUTO: 4.69 MILLION/UL (ref 3.81–5.12)
SODIUM SERPL-SCNC: 139 MMOL/L (ref 135–147)
TRIGL SERPL-MCNC: 450 MG/DL
TSH SERPL DL<=0.05 MIU/L-ACNC: 4 UIU/ML (ref 0.45–4.5)
WBC # BLD AUTO: 6.21 THOUSAND/UL (ref 4.31–10.16)

## 2024-08-12 PROCEDURE — 84443 ASSAY THYROID STIM HORMONE: CPT

## 2024-08-12 PROCEDURE — 85025 COMPLETE CBC W/AUTO DIFF WBC: CPT

## 2024-08-12 PROCEDURE — 36415 COLL VENOUS BLD VENIPUNCTURE: CPT

## 2024-08-12 PROCEDURE — 80053 COMPREHEN METABOLIC PANEL: CPT

## 2024-08-12 PROCEDURE — 80061 LIPID PANEL: CPT

## 2024-08-19 ENCOUNTER — HOSPITAL ENCOUNTER (OUTPATIENT)
Dept: ULTRASOUND IMAGING | Facility: HOSPITAL | Age: 24
Discharge: HOME | End: 2024-08-19
Payer: COMMERCIAL

## 2024-08-19 DIAGNOSIS — R74.8 ELEVATED LIVER ENZYMES: ICD-10-CM

## 2024-08-19 PROCEDURE — 76705 ECHO EXAM OF ABDOMEN: CPT

## 2024-09-17 ENCOUNTER — OFFICE VISIT (OUTPATIENT)
Dept: FAMILY MEDICINE CLINIC | Facility: CLINIC | Age: 24
End: 2024-09-17
Payer: COMMERCIAL

## 2024-09-17 VITALS
HEART RATE: 85 BPM | DIASTOLIC BLOOD PRESSURE: 80 MMHG | HEIGHT: 67 IN | OXYGEN SATURATION: 98 % | SYSTOLIC BLOOD PRESSURE: 128 MMHG | WEIGHT: 261 LBS | BODY MASS INDEX: 40.97 KG/M2

## 2024-09-17 DIAGNOSIS — R73.01 IMPAIRED FASTING GLUCOSE: ICD-10-CM

## 2024-09-17 DIAGNOSIS — Z00.00 ANNUAL PHYSICAL EXAM: Primary | ICD-10-CM

## 2024-09-17 DIAGNOSIS — F32.A DEPRESSION, UNSPECIFIED DEPRESSION TYPE: ICD-10-CM

## 2024-09-17 DIAGNOSIS — R74.8 ELEVATED LIVER ENZYMES: ICD-10-CM

## 2024-09-17 DIAGNOSIS — E66.01 CLASS 3 SEVERE OBESITY DUE TO EXCESS CALORIES WITH SERIOUS COMORBIDITY AND BODY MASS INDEX (BMI) OF 40.0 TO 44.9 IN ADULT (HCC): ICD-10-CM

## 2024-09-17 DIAGNOSIS — F41.9 ANXIETY: ICD-10-CM

## 2024-09-17 PROBLEM — E66.813 CLASS 3 SEVERE OBESITY DUE TO EXCESS CALORIES WITH SERIOUS COMORBIDITY AND BODY MASS INDEX (BMI) OF 40.0 TO 44.9 IN ADULT (HCC): Status: ACTIVE | Noted: 2024-09-17

## 2024-09-17 PROCEDURE — 99214 OFFICE O/P EST MOD 30 MIN: CPT | Performed by: INTERNAL MEDICINE

## 2024-09-17 PROCEDURE — 99395 PREV VISIT EST AGE 18-39: CPT | Performed by: INTERNAL MEDICINE

## 2024-09-17 RX ORDER — TIRZEPATIDE 7.5 MG/.5ML
7.5 INJECTION, SOLUTION SUBCUTANEOUS WEEKLY
Qty: 2 ML | Refills: 0 | Status: SHIPPED | OUTPATIENT
Start: 2024-11-12 | End: 2024-12-10

## 2024-09-17 RX ORDER — TIRZEPATIDE 5 MG/.5ML
5 INJECTION, SOLUTION SUBCUTANEOUS WEEKLY
Qty: 2 ML | Refills: 0 | Status: SHIPPED | OUTPATIENT
Start: 2024-10-15 | End: 2024-11-12

## 2024-09-17 RX ORDER — TIRZEPATIDE 12.5 MG/.5ML
12.5 INJECTION, SOLUTION SUBCUTANEOUS WEEKLY
Qty: 2 ML | Refills: 0 | Status: SHIPPED | OUTPATIENT
Start: 2025-01-07 | End: 2025-02-04

## 2024-09-17 RX ORDER — TIRZEPATIDE 10 MG/.5ML
10 INJECTION, SOLUTION SUBCUTANEOUS WEEKLY
Qty: 2 ML | Refills: 0 | Status: SHIPPED | OUTPATIENT
Start: 2024-12-10 | End: 2025-01-07

## 2024-09-17 RX ORDER — TIRZEPATIDE 15 MG/.5ML
15 INJECTION, SOLUTION SUBCUTANEOUS WEEKLY
Qty: 6 ML | Refills: 0 | Status: SHIPPED | OUTPATIENT
Start: 2025-02-04

## 2024-09-17 RX ORDER — TIRZEPATIDE 2.5 MG/.5ML
2.5 INJECTION, SOLUTION SUBCUTANEOUS WEEKLY
Qty: 2 ML | Refills: 0 | Status: SHIPPED | OUTPATIENT
Start: 2024-09-17

## 2024-09-17 NOTE — ASSESSMENT & PLAN NOTE
Related to weight gain discussed at length with patient initiating a diet and exercise program that she sticks with to see results-labwork d/w patient

## 2024-09-17 NOTE — ASSESSMENT & PLAN NOTE
Orders:    Zepbound 2.5 MG/0.5ML auto-injector; Inject 0.5 mL (2.5 mg total) under the skin once a week    Zepbound 5 MG/0.5ML auto-injector; Inject 0.5 mL (5 mg total) under the skin once a week for 28 days Do not start before October 15, 2024.    Zepbound 7.5 MG/0.5ML auto-injector; Inject 0.5 mL (7.5 mg total) under the skin once a week for 28 days Do not start before November 12, 2024.    Zepbound 10 MG/0.5ML auto-injector; Inject 0.5 mL (10 mg total) under the skin once a week for 28 days Do not start before December 10, 2024.    Zepbound 12.5 MG/0.5ML auto-injector; Inject 0.5 mL (12.5 mg total) under the skin once a week for 28 days Do not start before January 7, 2025.    Zepbound 15 MG/0.5ML auto-injector; Inject 0.5 mL (15 mg total) under the skin once a week Do not start before February 4, 2025.  Has gained a lot of weight, and she would like to try one of the injectable weight loss meds-will go ahead and write for Zepbound and see how it goes-discussed a diet and exercise program at length

## 2024-09-17 NOTE — PROGRESS NOTES
Adult Annual Physical  Name: Safia Saldana      : 2000      MRN: [id number]  Encounter Provider: Mendy Pedroza MD  Encounter Date: 2024   Encounter department: [facility] MEDICINE    Assessment & Plan  Anxiety       Safia doing better with her anxiety, on pristiq-did go through a period of severe depression and anxiety because of not knowing what she wanted to do, but has no figured out that she'd like to do social work  Depression, unspecified depression type  Depression Screening Follow-up Plan: Patient's depression screening was positive with a PHQ-9 score of 11. Patient advised to follow-up with PCP for further management.         Class 3 severe obesity due to excess calories with serious comorbidity and body mass index (BMI) of 40.0 to 44.9 in adult (HCC)    Orders:    Zepbound 2.5 MG/0.5ML auto-injector; Inject 0.5 mL (2.5 mg total) under the skin once a week    Zepbound 5 MG/0.5ML auto-injector; Inject 0.5 mL (5 mg total) under the skin once a week for 28 days Do not start before October 15, 2024.    Zepbound 7.5 MG/0.5ML auto-injector; Inject 0.5 mL (7.5 mg total) under the skin once a week for 28 days Do not start before 2024.    Zepbound 10 MG/0.5ML auto-injector; Inject 0.5 mL (10 mg total) under the skin once a week for 28 days Do not start before December 10, 2024.    Zepbound 12.5 MG/0.5ML auto-injector; Inject 0.5 mL (12.5 mg total) under the skin once a week for 28 days Do not start before 2025.    Zepbound 15 MG/0.5ML auto-injector; Inject 0.5 mL (15 mg total) under the skin once a week Do not start before 2025.  Has gained a lot of weight, and she would like to try one of the injectable weight loss meds-will go ahead and write for Zepbound and see how it goes-discussed a diet and exercise program at length  Elevated liver enzymes       Liver US was ok, and still has some labwork pending to rule out autoimmune liver issues  etc  Impaired fasting glucose       Related to weight gain discussed at length with patient initiating a diet and exercise program that she sticks with to see results-labwork d/w patient  Annual physical exam         Recent Results (from the past 8736 hour(s))   Chlamydia/GC amplified DNA by PCR    Collection Time: 02/23/24  4:34 PM    Specimen: Vaginal; Genital   Result Value Ref Range    N gonorrhoeae, DNA Probe Negative Negative    Chlamydia trachomatis, DNA Probe Negative Negative   CBC and differential    Collection Time: 08/12/24  9:59 AM   Result Value Ref Range    WBC 6.21 4.31 - 10.16 Thousand/uL    RBC 4.69 3.81 - 5.12 Million/uL    Hemoglobin 13.5 11.5 - 15.4 g/dL    Hematocrit 42.0 34.8 - 46.1 %    MCV 90 82 - 98 fL    MCH 28.8 26.8 - 34.3 pg    MCHC 32.1 31.4 - 37.4 g/dL    RDW 12.4 11.6 - 15.1 %    MPV 9.8 8.9 - 12.7 fL    Platelets 376 149 - 390 Thousands/uL    nRBC 0 /100 WBCs    Segmented % 53 43 - 75 %    Immature Grans % 0 0 - 2 %    Lymphocytes % 36 14 - 44 %    Monocytes % 8 4 - 12 %    Eosinophils Relative 2 0 - 6 %    Basophils Relative 1 0 - 1 %    Absolute Neutrophils 3.30 1.85 - 7.62 Thousands/µL    Absolute Immature Grans 0.01 0.00 - 0.20 Thousand/uL    Absolute Lymphocytes 2.25 0.60 - 4.47 Thousands/µL    Absolute Monocytes 0.52 0.17 - 1.22 Thousand/µL    Eosinophils Absolute 0.10 0.00 - 0.61 Thousand/µL    Basophils Absolute 0.03 0.00 - 0.10 Thousands/µL   Comprehensive metabolic panel    Collection Time: 08/12/24  9:59 AM   Result Value Ref Range    Sodium 139 135 - 147 mmol/L    Potassium 4.1 3.5 - 5.3 mmol/L    Chloride 103 96 - 108 mmol/L    CO2 25 21 - 32 mmol/L    ANION GAP 11 4 - 13 mmol/L    BUN 14 5 - 25 mg/dL    Creatinine 0.75 0.60 - 1.30 mg/dL    Glucose, Fasting 112 (H) 65 - 99 mg/dL    Calcium 9.3 8.4 - 10.2 mg/dL    AST 62 (H) 13 - 39 U/L     (H) 7 - 52 U/L    Alkaline Phosphatase 51 34 - 104 U/L    Total Protein 7.4 6.4 - 8.4 g/dL    Albumin 3.8 3.5 - 5.0 g/dL     Total Bilirubin 0.44 0.20 - 1.00 mg/dL    eGFR 113 ml/min/1.73sq m   TSH, 3rd generation    Collection Time: 08/12/24  9:59 AM   Result Value Ref Range    TSH 3RD GENERATON 3.997 0.450 - 4.500 uIU/mL   Lipid panel    Collection Time: 08/12/24  9:59 AM   Result Value Ref Range    Cholesterol 214 (H) See Comment mg/dL    Triglycerides 450 (H) See Comment mg/dL    HDL, Direct 37 (L) >=50 mg/dL    LDL Calculated      Non-HDL-Chol (CHOL-HDL) 177 mg/dl       Laboratory Results: I have personally reviewed the pertinent laboratory results/reports     Radiology/Other Diagnostic Testing Results: No pertinent imaging studies reviewed.   Immunizations and preventive care screenings were discussed with patient today. Appropriate education was printed on patient's after visit summary.    Counseling:  Alcohol/drug use: discussed moderation in alcohol intake, the recommendations for healthy alcohol use, and avoidance of illicit drug use.  Exercise: the importance of regular exercise/physical activity was discussed. Recommend exercise 3-5 times per week for at least 30 minutes.       Depression Screening and Follow-up Plan: Patient's depression screening was positive with a PHQ-9 score of 11. Patient advised to follow-up with PCP for further management.       History of Present Illness     Adult Annual Physical  Review of Systems   Constitutional:  Positive for unexpected weight change.   HENT: Negative.     Respiratory: Negative.     Cardiovascular: Negative.    Gastrointestinal: Negative.    Musculoskeletal: Negative.    Psychiatric/Behavioral:  Positive for dysphoric mood. The patient is nervous/anxious.      Current Outpatient Medications on File Prior to Visit   Medication Sig Dispense Refill    albuterol (Ventolin HFA) 90 mcg/act inhaler Inhale 2 puffs every 6 (six) hours as needed for wheezing 18 g 5    desvenlafaxine succinate (PRISTIQ) 50 mg 24 hr tablet TAKE 1 TABLET BY MOUTH EVERY DAY 90 tablet 1    drospirenone-ethinyl  "estradiol (VINICIO) 3-0.03 MG per tablet Take 1 tablet by mouth daily 90 tablet 3    hydrOXYzine HCL (ATARAX) 25 mg tablet Take 1 tablet (25 mg total) by mouth 2 (two) times a day as needed for anxiety 180 tablet 2     No current facility-administered medications on file prior to visit.      Social History     Tobacco Use    Smoking status: Never    Smokeless tobacco: Never   Vaping Use    Vaping status: Never Used   Substance and Sexual Activity    Alcohol use: Yes     Alcohol/week: 2.0 standard drinks of alcohol     Comment: social    Drug use: Never    Sexual activity: Yes     Partners: Male     Birth control/protection: Condom Male, OCP       Objective     /80 (BP Location: Left arm, Patient Position: Sitting, Cuff Size: Standard)   Pulse 85   Ht 5' 6.5\" (1.689 m)   Wt 118 kg (261 lb)   SpO2 98%   BMI 41.50 kg/m²     Physical Exam  Constitutional:       Appearance: She is obese.   HENT:      Head: Normocephalic and atraumatic.      Right Ear: External ear normal.      Left Ear: External ear normal.      Nose: Nose normal.      Mouth/Throat:      Mouth: Mucous membranes are moist.   Cardiovascular:      Rate and Rhythm: Normal rate and regular rhythm.      Heart sounds: Normal heart sounds.   Pulmonary:      Effort: Pulmonary effort is normal.      Breath sounds: Normal breath sounds.   Musculoskeletal:         General: Normal range of motion.      Cervical back: Normal range of motion and neck supple.   Skin:     General: Skin is warm.   Neurological:      General: No focal deficit present.      Mental Status: She is alert and oriented to person, place, and time.   Psychiatric:         Mood and Affect: Mood normal.         Behavior: Behavior normal.         Thought Content: Thought content normal.         Judgment: Judgment normal.         "

## 2024-09-17 NOTE — ASSESSMENT & PLAN NOTE
Depression Screening Follow-up Plan: Patient's depression screening was positive with a PHQ-9 score of 11. Patient advised to follow-up with PCP for further management.

## 2024-09-17 NOTE — ASSESSMENT & PLAN NOTE
Safia doing better with her anxiety, on pristiq-did go through a period of severe depression and anxiety because of not knowing what she wanted to do, but has no figured out that she'd like to do social work

## 2024-09-23 ENCOUNTER — TELEPHONE (OUTPATIENT)
Dept: FAMILY MEDICINE CLINIC | Facility: CLINIC | Age: 24
End: 2024-09-23

## 2024-09-23 NOTE — TELEPHONE ENCOUNTER
Fax from [facility] requesting PA on Zepbound 12.5mg/0.5ml  Ins ID:  [id number]

## 2024-09-25 NOTE — TELEPHONE ENCOUNTER
PA for Zepbound 2.5 MG/0.5ML auto-injector APPROVED     Date(s) approved 9- - 3-        Patient advised by          [x]Fusion[employer]art Message  []Phone call   [x]LMOM  []L/M to call office as no active Communication consent on file  []Unable to leave detailed message as VM not approved on Communication consent       Pharmacy advised by    [x]Fax  []Phone call    Approval letter scanned into Media Yes

## 2024-09-25 NOTE — TELEPHONE ENCOUNTER
PA for Zepbound 2.5 MG/0.5ML auto-injector SUBMITTED     via    []CMM-KEY:   [x]Surescripts-Case ID # [id number]  []Faxed to plan   []Other website   []Phone call Case ID #     Office notes sent, clinical questions answered. Awaiting determination    Turnaround time for your insurance to make a decision on your Prior Authorization can take 7-21 business days.     Zepbound 12.5mg is a future dose, initial dose of 2.5mg PA needs to be done before that dose.

## 2024-09-26 ENCOUNTER — TELEPHONE (OUTPATIENT)
Dept: FAMILY MEDICINE CLINIC | Facility: CLINIC | Age: 24
End: 2024-09-26

## 2024-09-26 ENCOUNTER — TELEPHONE (OUTPATIENT)
Age: 24
End: 2024-09-26

## 2024-09-26 NOTE — TELEPHONE ENCOUNTER
"Fax from [facility] regarding a \"new prescription request\" for Zepbound pen.  It's confusing, because there's a message in patients chart that said \"Wegovy\" was recently approved.  ALSO this fax request is coming from [facility] but last fill of Zepbound was filled at [facility] in [place]??  "

## 2024-10-04 DIAGNOSIS — E66.813 CLASS 3 SEVERE OBESITY WITH SERIOUS COMORBIDITY AND BODY MASS INDEX (BMI) OF 40.0 TO 44.9 IN ADULT, UNSPECIFIED OBESITY TYPE (HCC): Primary | ICD-10-CM

## 2024-10-04 DIAGNOSIS — E66.01 CLASS 3 SEVERE OBESITY WITH SERIOUS COMORBIDITY AND BODY MASS INDEX (BMI) OF 40.0 TO 44.9 IN ADULT, UNSPECIFIED OBESITY TYPE (HCC): Primary | ICD-10-CM

## 2024-10-04 RX ORDER — TIRZEPATIDE 15 MG/.5ML
15 INJECTION, SOLUTION SUBCUTANEOUS WEEKLY
Qty: 6 ML | Refills: 0 | Status: SHIPPED | OUTPATIENT
Start: 2025-02-21

## 2024-10-04 RX ORDER — TIRZEPATIDE 12.5 MG/.5ML
12.5 INJECTION, SOLUTION SUBCUTANEOUS WEEKLY
Qty: 2 ML | Refills: 0 | Status: SHIPPED | OUTPATIENT
Start: 2025-01-24 | End: 2025-02-21

## 2024-10-04 RX ORDER — TIRZEPATIDE 2.5 MG/.5ML
2.5 INJECTION, SOLUTION SUBCUTANEOUS WEEKLY
Qty: 2 ML | Refills: 0 | Status: SHIPPED | OUTPATIENT
Start: 2024-10-04

## 2024-10-04 RX ORDER — TIRZEPATIDE 5 MG/.5ML
5 INJECTION, SOLUTION SUBCUTANEOUS WEEKLY
Qty: 2 ML | Refills: 0 | Status: SHIPPED | OUTPATIENT
Start: 2024-11-01 | End: 2024-11-29

## 2024-10-04 RX ORDER — TIRZEPATIDE 10 MG/.5ML
10 INJECTION, SOLUTION SUBCUTANEOUS WEEKLY
Qty: 2 ML | Refills: 0 | Status: SHIPPED | OUTPATIENT
Start: 2024-12-27 | End: 2025-01-24

## 2024-10-04 RX ORDER — TIRZEPATIDE 7.5 MG/.5ML
7.5 INJECTION, SOLUTION SUBCUTANEOUS WEEKLY
Qty: 2 ML | Refills: 0 | Status: SHIPPED | OUTPATIENT
Start: 2024-11-29 | End: 2024-12-27

## 2024-10-05 DIAGNOSIS — F41.9 ANXIETY: ICD-10-CM

## 2024-10-05 RX ORDER — HYDROXYZINE HYDROCHLORIDE 25 MG/1
25 TABLET, FILM COATED ORAL 2 TIMES DAILY PRN
Qty: 60 TABLET | Refills: 5 | Status: SHIPPED | OUTPATIENT
Start: 2024-10-05

## 2024-10-14 ENCOUNTER — TELEPHONE (OUTPATIENT)
Dept: FAMILY MEDICINE CLINIC | Facility: CLINIC | Age: 24
End: 2024-10-14

## 2024-10-15 NOTE — TELEPHONE ENCOUNTER
Duplicate encounter created, please see telephone encounter from 9- regarding ZEPBOUND PA status. Please review patient's chart to see if there is already an encounter regarding the medication in question and to document anything regarding this medication in regards to anything regarding the authorization process etc before creating another encounter Thank You.

## 2024-11-30 DIAGNOSIS — F32.A DEPRESSION, UNSPECIFIED DEPRESSION TYPE: ICD-10-CM

## 2024-12-02 DIAGNOSIS — R11.0 NAUSEA: Primary | ICD-10-CM

## 2024-12-02 RX ORDER — DESVENLAFAXINE 50 MG/1
50 TABLET, FILM COATED, EXTENDED RELEASE ORAL DAILY
Qty: 90 TABLET | Refills: 1 | Status: SHIPPED | OUTPATIENT
Start: 2024-12-02

## 2024-12-02 RX ORDER — ONDANSETRON 4 MG/1
4 TABLET, FILM COATED ORAL 2 TIMES DAILY PRN
Qty: 40 TABLET | Refills: 3 | Status: SHIPPED | OUTPATIENT
Start: 2024-12-02 | End: 2024-12-12 | Stop reason: SDUPTHER

## 2024-12-12 ENCOUNTER — OFFICE VISIT (OUTPATIENT)
Dept: FAMILY MEDICINE CLINIC | Facility: CLINIC | Age: 24
End: 2024-12-12
Payer: COMMERCIAL

## 2024-12-12 VITALS
HEIGHT: 67 IN | BODY MASS INDEX: 37.2 KG/M2 | HEART RATE: 120 BPM | SYSTOLIC BLOOD PRESSURE: 124 MMHG | DIASTOLIC BLOOD PRESSURE: 80 MMHG | WEIGHT: 237 LBS | OXYGEN SATURATION: 97 %

## 2024-12-12 DIAGNOSIS — F41.9 ANXIETY: Primary | ICD-10-CM

## 2024-12-12 DIAGNOSIS — R73.01 IMPAIRED FASTING GLUCOSE: ICD-10-CM

## 2024-12-12 DIAGNOSIS — R74.8 ELEVATED LIVER ENZYMES: ICD-10-CM

## 2024-12-12 DIAGNOSIS — E66.813 CLASS 3 SEVERE OBESITY DUE TO EXCESS CALORIES WITH SERIOUS COMORBIDITY AND BODY MASS INDEX (BMI) OF 40.0 TO 44.9 IN ADULT (HCC): ICD-10-CM

## 2024-12-12 DIAGNOSIS — R11.0 NAUSEA: ICD-10-CM

## 2024-12-12 DIAGNOSIS — E66.01 CLASS 3 SEVERE OBESITY DUE TO EXCESS CALORIES WITH SERIOUS COMORBIDITY AND BODY MASS INDEX (BMI) OF 40.0 TO 44.9 IN ADULT (HCC): ICD-10-CM

## 2024-12-12 PROCEDURE — 99214 OFFICE O/P EST MOD 30 MIN: CPT | Performed by: INTERNAL MEDICINE

## 2024-12-12 RX ORDER — ONDANSETRON 4 MG/1
4 TABLET, FILM COATED ORAL 2 TIMES DAILY PRN
Qty: 40 TABLET | Refills: 3 | Status: SHIPPED | OUTPATIENT
Start: 2024-12-12

## 2024-12-12 NOTE — ASSESSMENT & PLAN NOTE
Safia has lost 30 sidney pounds on the zepbound and is doing really well-does have some nausea but will give her zofran for that-feels much better

## 2024-12-12 NOTE — ASSESSMENT & PLAN NOTE
Hopefully improved afer losting weight with Zepbound-encouraged heatlhy diet and exercise program too

## 2024-12-12 NOTE — PROGRESS NOTES
Assessment/Plan:         Problem List Items Addressed This Visit       Anxiety - Primary    On pristiq which seems to be working         Class 3 severe obesity due to excess calories with serious comorbidity and body mass index (BMI) of 40.0 to 44.9 in adult (HCC)    Safia has lost 30 sidney pounds on the zepbound and is doing really well-does have some nausea but will give her zofran for that-feels much better          Elevated liver enzymes    Repeat at some point         Impaired fasting glucose    Hopefully improved afer losting weight with Zepbound-encouraged heatlhy diet and exercise program too          Other Visit Diagnoses         Nausea        Relevant Medications    ondansetron (ZOFRAN) 4 mg tablet              Subjective:      Patient ID: Safia Saldana is a 24 y.o. female.    Safia here to go over anxiety, and use of zepbound-doing great on the zepbound has lost 30ish pounds and feels better and less anxious-currently on the 7.5 mg dose weekly        The following portions of the patient's history were reviewed and updated as appropriate:   Past Medical History:  She has a past medical history of Anxiety, Depression, Headache(784.0), Otitis media, and Seasonal allergies.,  _______________________________________________________________________  Medical Problems:  does not have any pertinent problems on file.,  _______________________________________________________________________  Past Surgical History:   has no past surgical history on file.,  _______________________________________________________________________  Family History:  family history includes Anxiety disorder in her mother; Asthma in her father and maternal grandfather; Breast cancer in her maternal grandmother; Cancer in her mother; Diabetes in her father, maternal grandfather, and paternal grandfather; Heart attack in her maternal grandfather, maternal grandmother, and mother; Heart disease in her father, maternal grandfather,  maternal grandmother, and mother; Hypertension in her maternal grandmother; Miscarriages / Stillbirths in her mother; No Known Problems in her brother and paternal grandmother; Thyroid disease in her mother.,  _______________________________________________________________________  Social History:   reports that she has never smoked. She has never used smokeless tobacco. She reports current alcohol use of about 2.0 standard drinks of alcohol per week. She reports that she does not use drugs.,  _______________________________________________________________________  Allergies:  has no known allergies..  _______________________________________________________________________  Current Outpatient Medications   Medication Sig Dispense Refill    albuterol (Ventolin HFA) 90 mcg/act inhaler Inhale 2 puffs every 6 (six) hours as needed for wheezing 18 g 5    desvenlafaxine succinate (PRISTIQ) 50 mg 24 hr tablet TAKE 1 TABLET BY MOUTH EVERY DAY 90 tablet 1    drospirenone-ethinyl estradiol (VINICIO) 3-0.03 MG per tablet Take 1 tablet by mouth daily 90 tablet 3    hydrOXYzine HCL (ATARAX) 25 mg tablet TAKE 1 TABLET BY MOUTH 2 TIMES A DAY AS NEEDED FOR ANXIETY. 60 tablet 5    ondansetron (ZOFRAN) 4 mg tablet Take 1 tablet (4 mg total) by mouth 2 (two) times a day as needed for nausea or vomiting 40 tablet 3    Zepbound 10 MG/0.5ML auto-injector Inject 0.5 mL (10 mg total) under the skin once a week for 28 days Do not start before December 10, 2024. 2 mL 0    [START ON 12/27/2024] Zepbound 10 MG/0.5ML auto-injector Inject 0.5 mL (10 mg total) under the skin once a week for 28 days Do not start before December 27, 2024. 2 mL 0    [START ON 1/7/2025] Zepbound 12.5 MG/0.5ML auto-injector Inject 0.5 mL (12.5 mg total) under the skin once a week for 28 days Do not start before January 7, 2025. 2 mL 0    [START ON 1/24/2025] Zepbound 12.5 MG/0.5ML auto-injector Inject 0.5 mL (12.5 mg total) under the skin once a week for 28 days Do not  "start before January 24, 2025. 2 mL 0    [START ON 2/4/2025] Zepbound 15 MG/0.5ML auto-injector Inject 0.5 mL (15 mg total) under the skin once a week Do not start before February 4, 2025. 6 mL 0    [START ON 2/21/2025] Zepbound 15 MG/0.5ML auto-injector Inject 0.5 mL (15 mg total) under the skin once a week Do not start before February 21, 2025. 6 mL 0    Zepbound 2.5 MG/0.5ML auto-injector Inject 0.5 mL (2.5 mg total) under the skin once a week 2 mL 0    Zepbound 2.5 MG/0.5ML auto-injector Inject 0.5 mL (2.5 mg total) under the skin once a week 2 mL 0    Zepbound 7.5 MG/0.5ML auto-injector Inject 0.5 mL (7.5 mg total) under the skin once a week for 28 days Do not start before November 29, 2024. 2 mL 0     No current facility-administered medications for this visit.     _______________________________________________________________________  Review of Systems   Constitutional:  Positive for unexpected weight change.   Gastrointestinal:  Positive for nausea.         Objective:  Vitals:    12/12/24 1121   BP: 124/80   BP Location: Left arm   Patient Position: Sitting   Cuff Size: Standard   Pulse: (!) 120   SpO2: 97%   Weight: 108 kg (237 lb)   Height: 5' 6.5\" (1.689 m)     Body mass index is 37.68 kg/m².     Physical Exam    "

## 2024-12-28 DIAGNOSIS — F41.9 ANXIETY: ICD-10-CM

## 2024-12-30 RX ORDER — HYDROXYZINE HYDROCHLORIDE 25 MG/1
25 TABLET, FILM COATED ORAL 2 TIMES DAILY PRN
Qty: 180 TABLET | Refills: 0 | Status: SHIPPED | OUTPATIENT
Start: 2024-12-30

## 2025-02-27 NOTE — PROGRESS NOTES
Name: Safia Saldana      : 2000      MRN: [id number]  Encounter Provider: SHRAVAN Casarez  Encounter Date: 2025   Encounter department: [facility] [place]    :  Assessment & Plan  Encounter for gynecological examination (general) (routine) without abnormal findings  Annual GYN examination completed today.   Health maintenance reviewed/updated as appropriate  Risk prevention and anticipatory guidance provided including:  Age related Calcium and vitamin D intake  Encouraged Breast self exams and to call with changes.  Dietary and lifestyle recommendations based on her age and weight. body mass index is 32.59 kg/m²..    Tobacco and alcohol use, intervention ordered if applicable.   Condom use for prevention of STI's. Requesting  STI Screening.       Surveillance of contraceptive pill  Doing well on OCP. Discussed use with zepbound which decreases effectiveness of OCP. Reviewed alternate  hormonal contraception and is considering patch. Already uses condoms when sexually active and may just continue with that.   Orders:  •  drospirenone-ethinyl estradiol (VINICIO) 3-0.03 MG per tablet; Take 1 tablet by mouth daily    Screen for STD (sexually transmitted disease)    Orders:  •  Chlamydia/GC amplified DNA by PCR  •  Hepatitis C antibody; Future  •  Hepatitis B surface antigen; Future  •  HIV 1/2 AG/AB w Reflex [facility] for 2 yr old and above; Future  •  RPR-Syphilis Screening (Total Syphilis IGG/IGM); Future    Acute vaginitis  Exam c/w yeast.  Given recurrence affirm sent to r/o co-infection.  Will give diflucan twice weekly x 4 wks.- 8 doses  Mycolog given for external symptom relief.   Orders:  •  nystatin-triamcinolone (MYCOLOG-II) ointment; Apply topically 2 (two) times a day  •  fluconazole (DIFLUCAN) 200 mg tablet; Take 1 tablet (200 mg total) by mouth 2 (two) times a week for 8 doses  •  VAGINOSIS DNA PROBE               History of Present Illness     Safia  Shana is a 25 y.o. 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   Patient denies menstrual complaints. Regular monthly menses, not excessive  Thinks she is having recurrent yeast infection.   Keeps feeling itching and increased d/c. Occasional malodor but not fishy  Typically treats with [employer] brand monistat.   Did change body wash already  Gain laundry  detergent  Does not use dryer sheet  Wears cotton thongs.      Denies Urinary complaints or breast changes    Sexually active: yes  Monogamous/single partner  Denies C/O related to intimacy/sexual activity  Contraception: OCP    She reports she feels safe at home.   Lifestyle/exercise: yes active/exercising   Calcium/vit D  intake: adequate      HEALTH MAINTENANCE SCREENINGS:     Previous pap smears and ASCCP screening guidelines have been reviewed.   Last Pap:  01/12/2023; Next due 2026  History of abnormal pap: No      IMMUNIZATIONS  Gardasil HPV vaccine Was completed    Hereditary Cancer Screening  FH Breast cancer: MGM  FH Ovarian cancer:no  FH Uterine cancer: no  FH Colon ca: no        Substance Abuse Screening Completed. See hx and flowsheet.    Review of Systems   Constitutional:  Negative for appetite change, chills, fatigue, fever and unexpected weight change.   HENT: Negative.     Eyes: Negative.    Respiratory:  Negative for chest tightness and shortness of breath.    Cardiovascular:  Negative for chest pain and palpitations.   Gastrointestinal:  Negative for abdominal pain, constipation and vomiting.   Endocrine: Negative for cold intolerance and heat intolerance.   Genitourinary:         As per HPI   Musculoskeletal:  Negative for back pain, joint swelling and neck pain.   Skin:  Negative for color change and rash.   Neurological:  Negative for dizziness, weakness and  "numbness.   Hematological:  Does not bruise/bleed easily.   Psychiatric/Behavioral: Negative.       The following portions of the patient's history were reviewed and updated as appropriate: allergies, current medications, past family history, past medical history, past social history, past surgical history, and problem list.      Objective:   Objective   /76 (BP Location: Left arm, Patient Position: Sitting, Cuff Size: Large)   Ht 5' 6.5\" (1.689 m)   Wt 93 kg (205 lb)   LMP 02/14/2025   BMI 32.59 kg/m²     Physical Exam  Constitutional:       General: She is not in acute distress.     Appearance: Normal appearance.   Genitourinary:      Vulva, bladder and rectum normal.      No lesions in the vagina.      Right Labia: No rash or lesions.     Left Labia: No lesions or rash.     Vaginal discharge (moderate amt thicker clumpy d/c), erythema and tenderness present.      No vaginal bleeding.      No vaginal prolapse present.     No vaginal atrophy present.       Right Adnexa: not tender and no mass present.     Left Adnexa: not tender and no mass present.     No cervical motion tenderness, discharge or friability.      Uterus is not enlarged or tender.      No urethral prolapse present.      Pelvic exam was performed with patient in the lithotomy position.   Breasts:     Breasts are symmetrical.      Right: No inverted nipple, mass, nipple discharge, skin change or tenderness.      Left: No inverted nipple, mass, nipple discharge, skin change or tenderness.   HENT:      Head: Normocephalic and atraumatic.   Cardiovascular:      Rate and Rhythm: Normal rate.      Heart sounds: No murmur heard.  Pulmonary:      Effort: Pulmonary effort is normal.      Breath sounds: Normal breath sounds.   Abdominal:      General: There is no distension.      Palpations: Abdomen is soft.      Tenderness: There is no abdominal tenderness.   Musculoskeletal:         General: Normal range of motion.      Cervical back: Normal range " of motion.   Lymphadenopathy:      Cervical: No cervical adenopathy.   Neurological:      Mental Status: She is alert and oriented to person, place, and time.   Skin:     General: Skin is warm and dry.   Psychiatric:         Mood and Affect: Mood normal.         Behavior: Behavior normal.   Vitals reviewed.

## 2025-02-28 ENCOUNTER — ANNUAL EXAM (OUTPATIENT)
Dept: OBGYN CLINIC | Facility: MEDICAL CENTER | Age: 25
End: 2025-02-28
Payer: COMMERCIAL

## 2025-02-28 VITALS
DIASTOLIC BLOOD PRESSURE: 76 MMHG | SYSTOLIC BLOOD PRESSURE: 122 MMHG | HEIGHT: 67 IN | BODY MASS INDEX: 32.18 KG/M2 | WEIGHT: 205 LBS

## 2025-02-28 DIAGNOSIS — Z11.3 SCREEN FOR STD (SEXUALLY TRANSMITTED DISEASE): ICD-10-CM

## 2025-02-28 DIAGNOSIS — N76.0 ACUTE VAGINITIS: ICD-10-CM

## 2025-02-28 DIAGNOSIS — Z01.419 ENCOUNTER FOR GYNECOLOGICAL EXAMINATION (GENERAL) (ROUTINE) WITHOUT ABNORMAL FINDINGS: Primary | ICD-10-CM

## 2025-02-28 DIAGNOSIS — Z30.41 SURVEILLANCE OF CONTRACEPTIVE PILL: ICD-10-CM

## 2025-02-28 PROBLEM — L02.31 ABSCESS, GLUTEAL, LEFT: Status: RESOLVED | Noted: 2022-08-16 | Resolved: 2025-02-28

## 2025-02-28 PROCEDURE — 87480 CANDIDA DNA DIR PROBE: CPT | Performed by: CLINICAL NURSE SPECIALIST

## 2025-02-28 PROCEDURE — 87660 TRICHOMONAS VAGIN DIR PROBE: CPT | Performed by: CLINICAL NURSE SPECIALIST

## 2025-02-28 PROCEDURE — 87591 N.GONORRHOEAE DNA AMP PROB: CPT | Performed by: CLINICAL NURSE SPECIALIST

## 2025-02-28 PROCEDURE — 87510 GARDNER VAG DNA DIR PROBE: CPT | Performed by: CLINICAL NURSE SPECIALIST

## 2025-02-28 PROCEDURE — 99395 PREV VISIT EST AGE 18-39: CPT | Performed by: CLINICAL NURSE SPECIALIST

## 2025-02-28 PROCEDURE — 87491 CHLMYD TRACH DNA AMP PROBE: CPT | Performed by: CLINICAL NURSE SPECIALIST

## 2025-02-28 RX ORDER — DROSPIRENONE AND ETHINYL ESTRADIOL 0.03MG-3MG
1 KIT ORAL DAILY
Qty: 90 TABLET | Refills: 3 | Status: SHIPPED | OUTPATIENT
Start: 2025-02-28

## 2025-02-28 RX ORDER — NYSTATIN AND TRIAMCINOLONE ACETONIDE 100000; 1 [USP'U]/G; MG/G
OINTMENT TOPICAL 2 TIMES DAILY
Qty: 15 G | Refills: 2 | Status: SHIPPED | OUTPATIENT
Start: 2025-02-28

## 2025-02-28 RX ORDER — FLUCONAZOLE 200 MG/1
200 TABLET ORAL 2 TIMES WEEKLY
Qty: 8 TABLET | Refills: 0 | Status: SHIPPED | OUTPATIENT
Start: 2025-03-03 | End: 2025-03-28

## 2025-03-01 LAB
CANDIDA RRNA VAG QL PROBE: DETECTED
G VAGINALIS RRNA GENITAL QL PROBE: DETECTED
T VAGINALIS RRNA GENITAL QL PROBE: NOT DETECTED

## 2025-03-03 ENCOUNTER — RESULTS FOLLOW-UP (OUTPATIENT)
Dept: OBGYN CLINIC | Facility: MEDICAL CENTER | Age: 25
End: 2025-03-03

## 2025-03-03 DIAGNOSIS — N76.0 ACUTE VAGINITIS: ICD-10-CM

## 2025-03-03 DIAGNOSIS — B96.89 BV (BACTERIAL VAGINOSIS): Primary | ICD-10-CM

## 2025-03-03 DIAGNOSIS — N76.0 BV (BACTERIAL VAGINOSIS): Primary | ICD-10-CM

## 2025-03-03 LAB
C TRACH DNA SPEC QL NAA+PROBE: NEGATIVE
N GONORRHOEA DNA SPEC QL NAA+PROBE: NEGATIVE

## 2025-03-03 RX ORDER — METRONIDAZOLE 500 MG/1
500 TABLET ORAL 2 TIMES DAILY
Qty: 14 TABLET | Refills: 0 | Status: SHIPPED | OUTPATIENT
Start: 2025-03-03 | End: 2025-03-10

## 2025-03-03 NOTE — RESULT ENCOUNTER NOTE
Vaginitis culture positive for BV and Yeast.  Negative for Trichomonas. Med for yeast given at appt.   Advise patient to take Flagyl 500 mg PO bid for 7 days as ordered. Also advise against alcohol use while on medication. Prescription sent to her pharmacy. Alternatives are available if alcohol consumption anticipated.

## 2025-03-26 DIAGNOSIS — F41.9 ANXIETY: ICD-10-CM

## 2025-03-26 RX ORDER — HYDROXYZINE HYDROCHLORIDE 25 MG/1
25 TABLET, FILM COATED ORAL 2 TIMES DAILY PRN
Qty: 60 TABLET | Refills: 5 | Status: SHIPPED | OUTPATIENT
Start: 2025-03-26

## 2025-04-12 ENCOUNTER — PATIENT MESSAGE (OUTPATIENT)
Dept: FAMILY MEDICINE CLINIC | Facility: CLINIC | Age: 25
End: 2025-04-12

## 2025-04-14 ENCOUNTER — TELEPHONE (OUTPATIENT)
Age: 25
End: 2025-04-14

## 2025-04-14 NOTE — TELEPHONE ENCOUNTER
Prior Authorization requested for Zepbound. Your patient is due for a follow up visit for medication management and weight check. Patient's last office visit was 12/12/24. Once visit is completed please send message back to the prior authorization POD so a prior authorization can be submitted. Thank you

## 2025-04-17 ENCOUNTER — OFFICE VISIT (OUTPATIENT)
Dept: FAMILY MEDICINE CLINIC | Facility: CLINIC | Age: 25
End: 2025-04-17
Payer: COMMERCIAL

## 2025-04-17 VITALS
DIASTOLIC BLOOD PRESSURE: 70 MMHG | HEART RATE: 128 BPM | WEIGHT: 194 LBS | OXYGEN SATURATION: 99 % | HEIGHT: 67 IN | BODY MASS INDEX: 30.45 KG/M2 | SYSTOLIC BLOOD PRESSURE: 126 MMHG

## 2025-04-17 DIAGNOSIS — F32.A DEPRESSION, UNSPECIFIED DEPRESSION TYPE: ICD-10-CM

## 2025-04-17 DIAGNOSIS — R11.0 NAUSEA: ICD-10-CM

## 2025-04-17 DIAGNOSIS — E66.813 CLASS 3 SEVERE OBESITY DUE TO EXCESS CALORIES WITH SERIOUS COMORBIDITY AND BODY MASS INDEX (BMI) OF 40.0 TO 44.9 IN ADULT: ICD-10-CM

## 2025-04-17 DIAGNOSIS — F41.9 ANXIETY: Primary | ICD-10-CM

## 2025-04-17 PROCEDURE — 99214 OFFICE O/P EST MOD 30 MIN: CPT | Performed by: INTERNAL MEDICINE

## 2025-04-17 RX ORDER — ONDANSETRON 4 MG/1
4 TABLET, FILM COATED ORAL 2 TIMES DAILY PRN
Qty: 40 TABLET | Refills: 3 | Status: SHIPPED | OUTPATIENT
Start: 2025-04-17

## 2025-04-17 NOTE — PROGRESS NOTES
Assessment/Plan:Safia is doing a lot better now on Zepbound and has lost a lot of weight and is doing well, and depression and anxiety are better too, feels better overall         Problem List Items Addressed This Visit       Anxiety - Primary    Depression    Class 3 severe obesity due to excess calories with serious comorbidity and body mass index (BMI) of 40.0 to 44.9 in adult (HCC)     Other Visit Diagnoses         Nausea        Relevant Medications    ondansetron (ZOFRAN) 4 mg tablet              Subjective:      Patient ID: Safia Saldana is a 25 y.o. female.    Safia here to touch base on her medicines-she is on Zepbound and has been doing very well on it-is down from 240 lbs to 194-feels good-is accepted into a Master's program that she's gong to be starting soon-still on Pristiq and that is working for her        The following portions of the patient's history were reviewed and updated as appropriate:   Past Medical History:  She has a past medical history of Anxiety, Depression, Headache(784.0), Otitis media, and Seasonal allergies.,  _______________________________________________________________________  Medical Problems:  does not have any pertinent problems on file.,  _______________________________________________________________________  Past Surgical History:   has no past surgical history on file.,  _______________________________________________________________________  Family History:  family history includes Anxiety disorder in her mother; Asthma in her father and maternal grandfather; Breast cancer in her maternal grandmother; Cancer in her mother; Diabetes in her father, maternal grandfather, and paternal grandfather; Heart attack in her maternal grandfather, maternal grandmother, and mother; Heart disease in her father, maternal grandfather, maternal grandmother, and mother; Hypertension in her maternal grandmother; Miscarriages / Stillbirths in her mother; No Known Problems in her  brother and paternal grandmother; Thyroid disease in her mother.,  _______________________________________________________________________  Social History:   reports that she has never smoked. She has never used smokeless tobacco. She reports current alcohol use of about 2.0 standard drinks of alcohol per week. She reports that she does not use drugs.,  _______________________________________________________________________  Allergies:  has no known allergies..  _______________________________________________________________________  Current Outpatient Medications   Medication Sig Dispense Refill    desvenlafaxine succinate (PRISTIQ) 50 mg 24 hr tablet TAKE 1 TABLET BY MOUTH EVERY DAY 90 tablet 1    drospirenone-ethinyl estradiol (VINICIO) 3-0.03 MG per tablet Take 1 tablet by mouth daily 90 tablet 3    hydrOXYzine HCL (ATARAX) 25 mg tablet TAKE 1 TABLET BY MOUTH TWICE A DAY AS NEEDED FOR ANXIETY 60 tablet 5    ondansetron (ZOFRAN) 4 mg tablet Take 1 tablet (4 mg total) by mouth 2 (two) times a day as needed for nausea or vomiting 40 tablet 3    Zepbound 15 MG/0.5ML auto-injector Inject 0.5 mL (15 mg total) under the skin once a week Do not start before February 4, 2025. 6 mL 0    Zepbound 15 MG/0.5ML auto-injector Inject 0.5 mL (15 mg total) under the skin once a week Do not start before February 21, 2025. 6 mL 0    Zepbound 2.5 MG/0.5ML auto-injector Inject 0.5 mL (2.5 mg total) under the skin once a week 2 mL 0    Zepbound 2.5 MG/0.5ML auto-injector Inject 0.5 mL (2.5 mg total) under the skin once a week 2 mL 0    nystatin-triamcinolone (MYCOLOG-II) ointment Apply topically 2 (two) times a day 15 g 2     No current facility-administered medications for this visit.     _______________________________________________________________________  Review of Systems   Constitutional: Negative.    HENT: Negative.     Respiratory: Negative.     Cardiovascular: Negative.    Musculoskeletal: Negative.    Hematological:  "Negative.    Psychiatric/Behavioral: Negative.           Objective:  Vitals:    04/17/25 1150   BP: 126/70   BP Location: Left arm   Patient Position: Sitting   Cuff Size: Standard   Pulse: (!) 128   SpO2: 99%   Weight: 88 kg (194 lb)   Height: 5' 6.5\" (1.689 m)     Body mass index is 30.84 kg/m².     Physical Exam  Constitutional:       Appearance: Normal appearance.   HENT:      Head: Normocephalic and atraumatic.      Right Ear: External ear normal.      Left Ear: External ear normal.      Nose: Nose normal.      Mouth/Throat:      Mouth: Mucous membranes are moist.   Eyes:      Extraocular Movements: Extraocular movements intact.   Pulmonary:      Effort: Pulmonary effort is normal.   Musculoskeletal:      Cervical back: Normal range of motion.   Skin:     General: Skin is warm.   Neurological:      General: No focal deficit present.      Mental Status: She is alert.   Psychiatric:         Mood and Affect: Mood normal.         Thought Content: Thought content normal.         "

## 2025-04-25 DIAGNOSIS — E66.813 CLASS 3 SEVERE OBESITY DUE TO EXCESS CALORIES WITH SERIOUS COMORBIDITY AND BODY MASS INDEX (BMI) OF 40.0 TO 44.9 IN ADULT: ICD-10-CM

## 2025-04-25 RX ORDER — TIRZEPATIDE 15 MG/.5ML
15 INJECTION, SOLUTION SUBCUTANEOUS WEEKLY
Qty: 6 ML | Refills: 0 | Status: SHIPPED | OUTPATIENT
Start: 2025-04-25

## 2025-04-28 ENCOUNTER — TELEPHONE (OUTPATIENT)
Age: 25
End: 2025-04-28

## 2025-04-28 NOTE — TELEPHONE ENCOUNTER
PA for ZEPBOUND 15 MG  APPROVED     Date(s) approved UNTIL 10/28/2025    Case #    Patient advised by          [x]MyChart Message  []Phone call   []LMOM  []L/M to call office as no active Communication consent on file  []Unable to leave detailed message as VM not approved on Communication consent       Pharmacy advised by    [x]Fax  []Phone call  []Secure Chat    Specialty Pharmacy    []     Approval letter scanned into Media Yes

## 2025-04-28 NOTE — TELEPHONE ENCOUNTER
Gissel from [facility] prior authorization department called in to let Dr. Pedroza know medication has been approved and correspondence letter will be faxed over. Medication is approved from 4/28/25 till 10/20/2025. Gissel left reference number - [id number]. Gissel asked if we could advise patient of approval.    Please advise, thank you

## 2025-04-28 NOTE — TELEPHONE ENCOUNTER
PA for ZEPBOUND 15MG SUBMITTED to [employer]    via      [x]Lookout-Case ID #     [x]PA sent as URGENT    All office notes, labs and other pertaining documents and studies sent. Clinical questions answered. Awaiting determination from insurance company.     Turnaround time for your insurance to make a decision on your Prior Authorization can take 7-21 business days.

## 2025-05-16 ENCOUNTER — HOSPITAL ENCOUNTER (EMERGENCY)
Facility: HOSPITAL | Age: 25
Discharge: HOME | End: 2025-05-16
Attending: EMERGENCY MEDICINE
Payer: COMMERCIAL

## 2025-05-16 VITALS
OXYGEN SATURATION: 99 % | HEART RATE: 89 BPM | TEMPERATURE: 99.5 F | SYSTOLIC BLOOD PRESSURE: 145 MMHG | DIASTOLIC BLOOD PRESSURE: 88 MMHG | RESPIRATION RATE: 20 BRPM

## 2025-05-16 DIAGNOSIS — R11.2 NAUSEA AND VOMITING: Primary | ICD-10-CM

## 2025-05-16 LAB
ALBUMIN SERPL BCG-MCNC: 5 G/DL (ref 3.5–5)
ALP SERPL-CCNC: 41 U/L (ref 34–104)
ALT SERPL W P-5'-P-CCNC: 33 U/L (ref 7–52)
ANION GAP SERPL CALCULATED.3IONS-SCNC: 17 MMOL/L (ref 4–13)
AST SERPL W P-5'-P-CCNC: 23 U/L (ref 13–39)
BACTERIA UR QL AUTO: ABNORMAL /HPF
BASOPHILS # BLD AUTO: 0.01 THOUSANDS/ÂΜL (ref 0–0.1)
BASOPHILS NFR BLD AUTO: 0 % (ref 0–1)
BILIRUB SERPL-MCNC: 0.71 MG/DL (ref 0.2–1)
BILIRUB UR QL STRIP: ABNORMAL
BUN SERPL-MCNC: 12 MG/DL (ref 5–25)
CALCIUM SERPL-MCNC: 10.5 MG/DL (ref 8.4–10.2)
CHLORIDE SERPL-SCNC: 99 MMOL/L (ref 96–108)
CLARITY UR: CLEAR
CO2 SERPL-SCNC: 22 MMOL/L (ref 21–32)
COLOR UR: YELLOW
CREAT SERPL-MCNC: 1.07 MG/DL (ref 0.6–1.3)
EOSINOPHIL # BLD AUTO: 0.03 THOUSAND/ÂΜL (ref 0–0.61)
EOSINOPHIL NFR BLD AUTO: 0 % (ref 0–6)
ERYTHROCYTE [DISTWIDTH] IN BLOOD BY AUTOMATED COUNT: 11.9 % (ref 11.6–15.1)
EXT PREGNANCY TEST URINE: NEGATIVE
EXT. CONTROL: NORMAL
GFR SERPL CREATININE-BSD FRML MDRD: 73 ML/MIN/1.73SQ M
GLUCOSE SERPL-MCNC: 147 MG/DL (ref 65–140)
GLUCOSE UR STRIP-MCNC: NEGATIVE MG/DL
HCT VFR BLD AUTO: 39 % (ref 34.8–46.1)
HGB BLD-MCNC: 13.8 G/DL (ref 11.5–15.4)
HGB UR QL STRIP.AUTO: NEGATIVE
HYALINE CASTS #/AREA URNS LPF: ABNORMAL /LPF
IMM GRANULOCYTES # BLD AUTO: 0.04 THOUSAND/UL (ref 0–0.2)
IMM GRANULOCYTES NFR BLD AUTO: 0 % (ref 0–2)
KETONES UR STRIP-MCNC: ABNORMAL MG/DL
LEUKOCYTE ESTERASE UR QL STRIP: NEGATIVE
LIPASE SERPL-CCNC: 7 U/L (ref 11–82)
LYMPHOCYTES # BLD AUTO: 1.21 THOUSANDS/ÂΜL (ref 0.6–4.47)
LYMPHOCYTES NFR BLD AUTO: 9 % (ref 14–44)
MCH RBC QN AUTO: 29.3 PG (ref 26.8–34.3)
MCHC RBC AUTO-ENTMCNC: 35.4 G/DL (ref 31.4–37.4)
MCV RBC AUTO: 83 FL (ref 82–98)
MONOCYTES # BLD AUTO: 0.66 THOUSAND/ÂΜL (ref 0.17–1.22)
MONOCYTES NFR BLD AUTO: 5 % (ref 4–12)
MUCOUS THREADS UR QL AUTO: ABNORMAL
NEUTROPHILS # BLD AUTO: 10.96 THOUSANDS/ÂΜL (ref 1.85–7.62)
NEUTS SEG NFR BLD AUTO: 86 % (ref 43–75)
NITRITE UR QL STRIP: NEGATIVE
NON-SQ EPI CELLS URNS QL MICRO: ABNORMAL /HPF
NRBC BLD AUTO-RTO: 0 /100 WBCS
PH UR STRIP.AUTO: 6.5 [PH]
PLATELET # BLD AUTO: 466 THOUSANDS/UL (ref 149–390)
PMV BLD AUTO: 9.8 FL (ref 8.9–12.7)
POTASSIUM SERPL-SCNC: 3.3 MMOL/L (ref 3.5–5.3)
PROT SERPL-MCNC: 7.9 G/DL (ref 6.4–8.4)
PROT UR STRIP-MCNC: ABNORMAL MG/DL
RBC # BLD AUTO: 4.71 MILLION/UL (ref 3.81–5.12)
RBC #/AREA URNS AUTO: ABNORMAL /HPF
SODIUM SERPL-SCNC: 138 MMOL/L (ref 135–147)
SP GR UR STRIP.AUTO: 1.02 (ref 1–1.03)
UROBILINOGEN UR QL STRIP.AUTO: 0.2 E.U./DL
WBC # BLD AUTO: 12.91 THOUSAND/UL (ref 4.31–10.16)
WBC #/AREA URNS AUTO: ABNORMAL /HPF

## 2025-05-16 PROCEDURE — 83690 ASSAY OF LIPASE: CPT

## 2025-05-16 PROCEDURE — 80053 COMPREHEN METABOLIC PANEL: CPT

## 2025-05-16 PROCEDURE — 96375 TX/PRO/DX INJ NEW DRUG ADDON: CPT

## 2025-05-16 PROCEDURE — 81003 URINALYSIS AUTO W/O SCOPE: CPT

## 2025-05-16 PROCEDURE — 96361 HYDRATE IV INFUSION ADD-ON: CPT

## 2025-05-16 PROCEDURE — 36415 COLL VENOUS BLD VENIPUNCTURE: CPT

## 2025-05-16 PROCEDURE — 85025 COMPLETE CBC W/AUTO DIFF WBC: CPT

## 2025-05-16 PROCEDURE — 99283 EMERGENCY DEPT VISIT LOW MDM: CPT

## 2025-05-16 PROCEDURE — 81001 URINALYSIS AUTO W/SCOPE: CPT

## 2025-05-16 PROCEDURE — 99284 EMERGENCY DEPT VISIT MOD MDM: CPT

## 2025-05-16 PROCEDURE — 81025 URINE PREGNANCY TEST: CPT

## 2025-05-16 PROCEDURE — 96374 THER/PROPH/DIAG INJ IV PUSH: CPT

## 2025-05-16 RX ORDER — POTASSIUM CHLORIDE 1500 MG/1
40 TABLET, EXTENDED RELEASE ORAL ONCE
Status: COMPLETED | OUTPATIENT
Start: 2025-05-16 | End: 2025-05-16

## 2025-05-16 RX ORDER — ONDANSETRON 2 MG/ML
4 INJECTION INTRAMUSCULAR; INTRAVENOUS ONCE
Status: COMPLETED | OUTPATIENT
Start: 2025-05-16 | End: 2025-05-16

## 2025-05-16 RX ORDER — METOCLOPRAMIDE HYDROCHLORIDE 5 MG/ML
10 INJECTION INTRAMUSCULAR; INTRAVENOUS ONCE
Status: COMPLETED | OUTPATIENT
Start: 2025-05-16 | End: 2025-05-16

## 2025-05-16 RX ORDER — METOCLOPRAMIDE 10 MG/1
10 TABLET ORAL EVERY 8 HOURS PRN
Qty: 10 TABLET | Refills: 0 | Status: SHIPPED | OUTPATIENT
Start: 2025-05-16

## 2025-05-16 RX ADMIN — SODIUM CHLORIDE 1000 ML: 0.9 INJECTION, SOLUTION INTRAVENOUS at 10:27

## 2025-05-16 RX ADMIN — POTASSIUM CHLORIDE 40 MEQ: 1500 TABLET, EXTENDED RELEASE ORAL at 11:55

## 2025-05-16 RX ADMIN — ONDANSETRON 4 MG: 2 INJECTION INTRAMUSCULAR; INTRAVENOUS at 10:27

## 2025-05-16 RX ADMIN — METOCLOPRAMIDE 10 MG: 5 INJECTION, SOLUTION INTRAMUSCULAR; INTRAVENOUS at 11:07

## 2025-05-16 NOTE — ED NOTES
Patient reports nausea has not subsided. Provider made aware     Bernice Donohue RN  05/16/25 [id number]

## 2025-05-16 NOTE — ED NOTES
Patient continues to drink from personal water tumbler after encouraged to stop irrigating stomach; emesis is clear     Brenice Donohue RN  05/16/25 [id number]

## 2025-05-16 NOTE — DISCHARGE INSTRUCTIONS
-may use Zofran or Reglan every 8 hours as needed for nausea  -increase fluid intake and keep a bland diet to allow your stomach to rest  -avoid foods high in fat, spice, and acid  -follow-up with PCP and return to the ED for any worsening symptoms.

## 2025-05-16 NOTE — ED NOTES
Patient aware of urine sample needed, unable to provide at this time,will continue to encourage.     Marilynn Sung  05/16/25 1100

## 2025-05-16 NOTE — ED PROVIDER NOTES
Time reflects when diagnosis was documented in both MDM as applicable and the Disposition within this note       Time User Action Codes Description Comment    5/16/2025 11:52 AM Maria M Byrnes Add [R11.2] Nausea and vomiting           ED Disposition       ED Disposition   Discharge    Condition   Stable    Date/Time   Fri May 16, 2025 12:10 PM    Comment   Safia Saldana discharge to home/self care.                   Assessment & Plan       Medical Decision Making  Patient is a 25-year-old female presenting for evaluation of nausea and vomiting.  Upon examination, patient is ill-appearing but does not appear in acute distress.  She is dry heaving and is slightly diaphoretic.  Vital signs showing a temperature of 99.5.  Normal heart and lung sounds noted.  No abdominal tenderness upon palpation.    Differentials include but are not limited to: Gastritis, food poisoning, electrolyte imbalance, urinary tract infection, and pregnancy.  Hyperemesis cannabinoid less likely as patient has not smoked marijuana in weeks.  Low suspicion for cholecystitis or appendicitis given lack of focal abdominal tenderness.    Blood work showing leukocytosis of 12.91 suspect secondary to episodes of vomiting.  Low suspicion for acute abdominal infection given focal tenderness.  CMP showing a potassium of 3.3 consistent with patient's innumerable episodes of vomiting.  Anion gap is 17 and blood sugar is 147.  Patient was given a liter of fluids and Zofran.  She was not experiencing much relief with Zofran so Reglan was given in addition.  Patient was reevaluated and states that she feels significantly better after receiving the Reglan.  She was then p.o. challenged with crackers and water and was provided with potassium pills.  Notes that she is beginning to feel nauseous but believes that it is due to the potassium pills.  She was given some ginger ale as well as alcohol swabs to smell to help with the nausea.  Additional antiemetics  held as she has received Zofran and Reglan back-to-back.  Patient verbalizes understanding.  Urinalysis was checked for any UTI given patient's dysuria and it was negative.  UA did show ketones which is consistent with her vomiting and dehydration.    Results were discussed with patient and she feels comfortable discharge at this time.  Advised that she should refrain from eating any foods high in grease, spice, and acid which may trigger her stomach and she should maintain a bland diet for the next few days.  Encouraged increased hydration and rest.  Reviewed over strict ED return precautions including the development of fever, chills, abdominal pain, or persistent vomiting despite taking medications.  Prescription for Reglan was sent to her pharmacy advised that she should take either the Zofran or the Reglan but should not take them together.  Patient verbalizes understanding of discharge instructions and follow-up care at this time.    Amount and/or Complexity of Data Reviewed  Labs: ordered. Decision-making details documented in ED Course.     Details: Reviewed    Risk  Prescription drug management.        ED Course as of 05/16/25 1746   Fri May 16, 2025   1036 WBC(!): 12.91  Suspect secondary to innumerable episodes of vomiting.   1111 LIPASE(!): 7   1151 Potassium(!): 3.3  Will replete with 40 mEq p.o.   1203 Patient has stopped vomiting and appears much better. Notes that her nausea has significantly improved after Reglan and she feels comfortable with PO challenge.   1208 Ketones, UA(!): 80 (3+)  Patient received IV fluids and is now drinking PO fluids.    1231 Urine Microscopic(!)  Negative UTI. Innumerable epithelial cells and moderate bacteria.        Medications   sodium chloride 0.9 % bolus 1,000 mL (0 mL Intravenous Stopped 5/16/25 1148)   ondansetron (ZOFRAN) injection 4 mg (4 mg Intravenous Given 5/16/25 1027)   metoclopramide (REGLAN) injection 10 mg (10 mg Intravenous Given 5/16/25 1107)    potassium chloride (Klor-Con M20) CR tablet 40 mEq (40 mEq Oral Given 5/16/25 1155)       ED Risk Strat Scores                    No data recorded        SBIRT 22yo+      Flowsheet Row Most Recent Value   Initial Alcohol Screen: US AUDIT-C     1. How often do you have a drink containing alcohol? 0 Filed at: 05/16/2025 0950   2. How many drinks containing alcohol do you have on a typical day you are drinking?  0 Filed at: 05/16/2025 0950   3b. FEMALE Any Age, or MALE 65+: How often do you have 4 or more drinks on one occassion? 0 Filed at: 05/16/2025 0950   Audit-C Score 0 Filed at: 05/16/2025 0950   HUI: How many times in the past year have you...    Used an illegal drug or used a prescription medication for non-medical reasons? Never Filed at: 05/16/2025 0950                            History of Present Illness       Chief Complaint   Patient presents with    Vomiting     Patient vomiting since last night, recently restarted her zepbound after being off for 3 weeks. Was restarted at her regular dose instead of lowest. Mother reports she had bad pasta Wednesday night and could be food poisoning.        Past Medical History:   Diagnosis Date    Anxiety     Depression     Headache(784.0)     Otitis media     Seasonal allergies       History reviewed. No pertinent surgical history.   Family History   Problem Relation Age of Onset    Heart attack Mother     Heart disease Mother     Cancer Mother         Thyroid cancer    Miscarriages / Stillbirths Mother     Thyroid disease Mother     Anxiety disorder Mother     Heart disease Father     Diabetes Father     Asthma Father     No Known Problems Brother     Breast cancer Maternal Grandmother     Hypertension Maternal Grandmother     Heart disease Maternal Grandmother     Heart attack Maternal Grandmother     Heart disease Maternal Grandfather     Diabetes Maternal Grandfather     Asthma Maternal Grandfather     Heart attack Maternal Grandfather     No Known Problems  Paternal Grandmother     Diabetes Paternal Grandfather     Colon cancer Neg Hx     Ovarian cancer Neg Hx       Social History[1]   E-Cigarette/Vaping    E-Cigarette Use Never User       E-Cigarette/Vaping Substances    Nicotine No     THC No     CBD No     Flavoring No       I have reviewed and agree with the history as documented.     Patient is a 25-year-old female with a past medical history including headaches, anxiety, and depression. Presents today for evaluation of nausea and vomiting.  States that she began with some nausea and vomiting yesterday evening which had worsened overnight.  Mother believes that the pasta that she ate the evening before was not good and she may have potential food poisoning.  Patient's mother also reports that she was off of Zepbound for 3 weeks as they were getting it reauthorized and she was placed back on it at her previous level, not the starting level, and is unsure if that is contributing.  Reports innumerable episodes of vomiting, at least 10 episodes today, which have been bile-colored with occasional specks of blood.  Denies any gross amount of blood in the emesis.  Has tried some Zofran at 8 AM as well as some Pepto but was unable to keep that down.  Has not been able to keep down food or fluids as well.  She denies any fever, chest pain, shortness of breath, or abdominal pain.  Denies any recent marijuana use.      Vomiting  Associated symptoms: chills    Associated symptoms: no abdominal pain, no diarrhea and no fever        Review of Systems   Constitutional:  Positive for chills. Negative for fever.   Respiratory:  Negative for shortness of breath.    Cardiovascular:  Positive for chest pain (when coughing).   Gastrointestinal:  Positive for nausea and vomiting. Negative for abdominal pain, constipation and diarrhea.   Genitourinary:  Positive for dysuria. Negative for frequency and urgency.           Objective       ED Triage Vitals   Temperature Pulse Blood Pressure  Respirations SpO2 Patient Position - Orthostatic VS   05/16/25 1124 05/16/25 0949 05/16/25 0949 05/16/25 0949 05/16/25 0949 05/16/25 0949   99.5 °F (37.5 °C) 89 145/88 20 99 % Sitting      Temp Source Heart Rate Source BP Location FiO2 (%) Pain Score    05/16/25 1124 05/16/25 0949 05/16/25 0949 -- 05/16/25 0949    Axillary Monitor Left arm  7      Vitals      Date and Time Temp Pulse SpO2 Resp BP Pain Score FACES Pain Rating User   05/16/25 1124 99.5 °F (37.5 °C) -- -- -- -- -- -- DU   05/16/25 0949 -- 89 99 % 20 145/88 7 -- DU            Physical Exam  Vitals and nursing note reviewed.   Constitutional:       General: She is not in acute distress.     Appearance: She is ill-appearing.   HENT:      Head: Normocephalic and atraumatic.     Cardiovascular:      Rate and Rhythm: Normal rate.      Pulses: Normal pulses.      Heart sounds: Normal heart sounds.   Pulmonary:      Effort: Pulmonary effort is normal.      Breath sounds: Normal breath sounds.   Abdominal:      General: Abdomen is flat.      Palpations: Abdomen is soft.      Tenderness: There is no abdominal tenderness.     Musculoskeletal:         General: Normal range of motion.     Skin:     General: Skin is warm and dry.      Capillary Refill: Capillary refill takes less than 2 seconds.     Neurological:      General: No focal deficit present.      Mental Status: She is alert and oriented to person, place, and time.     Psychiatric:         Mood and Affect: Mood normal.         Behavior: Behavior normal.         Results Reviewed       Procedure Component Value Units Date/Time    Urine Microscopic [[id number]]  (Abnormal) Collected: 05/16/25 1155    Lab Status: Final result Specimen: Urine, Clean Catch Updated: 05/16/25 1226     RBC, UA 0-1 /hpf      WBC, UA 2-4 /hpf      Epithelial Cells Innumerable /hpf      Bacteria, UA Moderate /hpf      Hyaline Casts, UA 1-2 /lpf      MUCUS THREADS Occasional    UA w Reflex to Microscopic w Reflex to Culture [[id number]]   (Abnormal) Collected: 05/16/25 1155    Lab Status: Final result Specimen: Urine, Clean Catch Updated: 05/16/25 1204     Color, UA Yellow     Clarity, UA Clear     Specific Gravity, UA 1.025     pH, UA 6.5     Leukocytes, UA Negative     Nitrite, UA Negative     Protein, UA 2+ mg/dl      Glucose, UA Negative mg/dl      Ketones, UA 80 (3+) mg/dl      Urobilinogen, UA 0.2 E.U./dl      Bilirubin, UA 1+     Occult Blood, UA Negative    POCT pregnancy, urine [[id number]]  (Normal) Collected: 05/16/25 1159    Lab Status: Final result Updated: 05/16/25 1159     EXT Preg Test, Ur Negative     Control Valid    Comprehensive metabolic panel [[id number]]  (Abnormal) Collected: 05/16/25 1026    Lab Status: Final result Specimen: Blood from Arm, Right Updated: 05/16/25 1110     Sodium 138 mmol/L      Potassium 3.3 mmol/L      Chloride 99 mmol/L      CO2 22 mmol/L      ANION GAP 17 mmol/L      BUN 12 mg/dL      Creatinine 1.07 mg/dL      Glucose 147 mg/dL      Calcium 10.5 mg/dL      AST 23 U/L      ALT 33 U/L      Alkaline Phosphatase 41 U/L      Total Protein 7.9 g/dL      Albumin 5.0 g/dL      Total Bilirubin 0.71 mg/dL      eGFR 73 ml/min/1.73sq m     Narrative:      [employer] guidelines for Chronic Kidney Disease (CKD):     Stage 1 with normal or high GFR (GFR > 90 mL/min/1.73 square meters)    Stage 2 Mild CKD (GFR = 60-89 mL/min/1.73 square meters)    Stage 3A Moderate CKD (GFR = 45-59 mL/min/1.73 square meters)    Stage 3B Moderate CKD (GFR = 30-44 mL/min/1.73 square meters)    Stage 4 Severe CKD (GFR = 15-29 mL/min/1.73 square meters)    Stage 5 End Stage CKD (GFR <15 mL/min/1.73 square meters)  Note: GFR calculation is accurate only with a steady state creatinine    Lipase [[id number]]  (Abnormal) Collected: 05/16/25 1026    Lab Status: Final result Specimen: Blood from Arm, Right Updated: 05/16/25 1110     Lipase 7 u/L     CBC and differential [[id number]]  (Abnormal) Collected: 05/16/25 1026    Lab  Status: Final result Specimen: Blood from Arm, Right Updated: 05/16/25 1033     WBC 12.91 Thousand/uL      RBC 4.71 Million/uL      Hemoglobin 13.8 g/dL      Hematocrit 39.0 %      MCV 83 fL      MCH 29.3 pg      MCHC 35.4 g/dL      RDW 11.9 %      MPV 9.8 fL      Platelets 466 Thousands/uL      nRBC 0 /100 WBCs      Segmented % 86 %      Immature Grans % 0 %      Lymphocytes % 9 %      Monocytes % 5 %      Eosinophils Relative 0 %      Basophils Relative 0 %      Absolute Neutrophils 10.96 Thousands/µL      Absolute Immature Grans 0.04 Thousand/uL      Absolute Lymphocytes 1.21 Thousands/µL      Absolute Monocytes 0.66 Thousand/µL      Eosinophils Absolute 0.03 Thousand/µL      Basophils Absolute 0.01 Thousands/µL             No orders to display       Procedures    ED Medication and Procedure Management   Prior to Admission Medications   Prescriptions Last Dose Informant Patient Reported? Taking?   Zepbound 15 MG/0.5ML auto-injector   No No   Sig: Inject 0.5 mL (15 mg total) under the skin once a week Do not start before February 21, 2025.   Zepbound 15 MG/0.5ML auto-injector   No No   Sig: Inject 0.5 mL (15 mg total) under the skin once a week   Zepbound 2.5 MG/0.5ML auto-injector   No No   Sig: Inject 0.5 mL (2.5 mg total) under the skin once a week   Zepbound 2.5 MG/0.5ML auto-injector   No No   Sig: Inject 0.5 mL (2.5 mg total) under the skin once a week   desvenlafaxine succinate (PRISTIQ) 50 mg 24 hr tablet   No No   Sig: TAKE 1 TABLET BY MOUTH EVERY DAY   drospirenone-ethinyl estradiol (VINICIO) 3-0.03 MG per tablet   No No   Sig: Take 1 tablet by mouth daily   hydrOXYzine HCL (ATARAX) 25 mg tablet   No No   Sig: TAKE 1 TABLET BY MOUTH TWICE A DAY AS NEEDED FOR ANXIETY   nystatin-triamcinolone (MYCOLOG-II) ointment   No No   Sig: Apply topically 2 (two) times a day   ondansetron (ZOFRAN) 4 mg tablet   No No   Sig: Take 1 tablet (4 mg total) by mouth 2 (two) times a day as needed for nausea or vomiting       Facility-Administered Medications: None     Discharge Medication List as of 5/16/2025 12:32 PM        START taking these medications    Details   metoclopramide (Reglan) 10 mg tablet Take 1 tablet (10 mg total) by mouth every 8 (eight) hours as needed (nausea), Starting Fri 5/16/2025, Normal           CONTINUE these medications which have NOT CHANGED    Details   desvenlafaxine succinate (PRISTIQ) 50 mg 24 hr tablet TAKE 1 TABLET BY MOUTH EVERY DAY, Starting Mon 12/2/2024, Normal      drospirenone-ethinyl estradiol (VINICIO) 3-0.03 MG per tablet Take 1 tablet by mouth daily, Starting Fri 2/28/2025, Normal      hydrOXYzine HCL (ATARAX) 25 mg tablet TAKE 1 TABLET BY MOUTH TWICE A DAY AS NEEDED FOR ANXIETY, Starting Wed 3/26/2025, Normal      nystatin-triamcinolone (MYCOLOG-II) ointment Apply topically 2 (two) times a day, Starting Fri 2/28/2025, Normal      ondansetron (ZOFRAN) 4 mg tablet Take 1 tablet (4 mg total) by mouth 2 (two) times a day as needed for nausea or vomiting, Starting Thu 4/17/2025, Normal      !! Zepbound 15 MG/0.5ML auto-injector Inject 0.5 mL (15 mg total) under the skin once a week Do not start before February 21, 2025., Starting Fri 2/21/2025, Normal      !! Zepbound 15 MG/0.5ML auto-injector Inject 0.5 mL (15 mg total) under the skin once a week, Starting Fri 4/25/2025, Normal      !! Zepbound 2.5 MG/0.5ML auto-injector Inject 0.5 mL (2.5 mg total) under the skin once a week, Starting Tue 9/17/2024, Normal      !! Zepbound 2.5 MG/0.5ML auto-injector Inject 0.5 mL (2.5 mg total) under the skin once a week, Starting Fri 10/4/2024, Normal       !! - Potential duplicate medications found. Please discuss with provider.        No discharge procedures on file.  ED SEPSIS DOCUMENTATION   Time reflects when diagnosis was documented in both MDM as applicable and the Disposition within this note       Time User Action Codes Description Comment    5/16/2025 11:52 AM Maria M Byrnes Add [R11.2] Nausea  and vomiting                      [1]   Social History  Tobacco Use    Smoking status: Never    Smokeless tobacco: Never   Vaping Use    Vaping status: Never Used   Substance Use Topics    Alcohol use: Yes     Alcohol/week: 2.0 standard drinks of alcohol     Types: 2 Shots of liquor per week     Comment: social    Drug use: Never        SHRAVAN Norris  05/16/25 [id number]

## 2025-05-24 DIAGNOSIS — F32.A DEPRESSION, UNSPECIFIED DEPRESSION TYPE: ICD-10-CM

## 2025-05-25 RX ORDER — DESVENLAFAXINE 50 MG/1
50 TABLET, FILM COATED, EXTENDED RELEASE ORAL DAILY
Qty: 90 TABLET | Refills: 1 | Status: SHIPPED | OUTPATIENT
Start: 2025-05-25